# Patient Record
Sex: FEMALE | Race: WHITE | Employment: OTHER | URBAN - METROPOLITAN AREA
[De-identification: names, ages, dates, MRNs, and addresses within clinical notes are randomized per-mention and may not be internally consistent; named-entity substitution may affect disease eponyms.]

---

## 2017-01-01 ENCOUNTER — HOSPITAL ENCOUNTER (EMERGENCY)
Age: 82
Discharge: HOME | End: 2017-09-10
Attending: EMERGENCY MEDICINE
Payer: MEDICARE

## 2017-01-01 ENCOUNTER — HOSPITAL ENCOUNTER (OUTPATIENT)
Dept: CARDIAC CATH/INVASIVE PROCEDURES | Age: 82
Discharge: HOME | End: 2017-10-18
Attending: INTERNAL MEDICINE | Admitting: INTERNAL MEDICINE
Payer: MEDICARE

## 2017-01-01 ENCOUNTER — HOSPITAL ENCOUNTER (OUTPATIENT)
Dept: CARDIOLOGY | Age: 82
Discharge: HOME | End: 2017-10-05
Payer: MEDICARE

## 2017-01-01 ENCOUNTER — TELEPHONE (OUTPATIENT)
Dept: SURGERY | Age: 82
End: 2017-01-01

## 2017-01-01 ENCOUNTER — HOSPITAL ENCOUNTER (OUTPATIENT)
Dept: CARDIOLOGY | Age: 82
Discharge: HOME | End: 2017-08-02
Payer: MEDICARE

## 2017-01-01 ENCOUNTER — HOSPITAL ENCOUNTER (EMERGENCY)
Age: 82
Discharge: HOME | End: 2017-12-20
Attending: EMERGENCY MEDICINE
Payer: MEDICARE

## 2017-01-01 ENCOUNTER — HOSPITAL ENCOUNTER (OUTPATIENT)
Dept: CARDIOLOGY | Age: 82
Discharge: HOME | End: 2017-09-05
Payer: MEDICARE

## 2017-01-01 ENCOUNTER — APPOINTMENT (OUTPATIENT)
Dept: CT IMAGING | Age: 82
End: 2017-01-01
Payer: MEDICARE

## 2017-01-01 ENCOUNTER — TELEPHONE (OUTPATIENT)
Dept: INTERNAL MEDICINE | Age: 82
End: 2017-01-01

## 2017-01-01 ENCOUNTER — HOSPITAL ENCOUNTER (OUTPATIENT)
Dept: INFUSION THERAPY | Age: 82
Setting detail: INFUSION SERIES
Discharge: HOME | End: 2017-12-22
Payer: MEDICARE

## 2017-01-01 ENCOUNTER — HOSPITAL ENCOUNTER (OUTPATIENT)
Age: 82
Setting detail: OBSERVATION
Discharge: HOME | End: 2017-12-28
Attending: INTERNAL MEDICINE | Admitting: INTERNAL MEDICINE
Payer: MEDICARE

## 2017-01-01 ENCOUNTER — NURSE ONLY (OUTPATIENT)
Dept: INTERNAL MEDICINE | Age: 82
End: 2017-01-01

## 2017-01-01 ENCOUNTER — OFFICE VISIT (OUTPATIENT)
Dept: INTERNAL MEDICINE | Age: 82
End: 2017-01-01

## 2017-01-01 ENCOUNTER — OFFICE VISIT (OUTPATIENT)
Dept: SURGERY | Age: 82
End: 2017-01-01

## 2017-01-01 VITALS
HEIGHT: 65 IN | OXYGEN SATURATION: 97 % | WEIGHT: 150 LBS | DIASTOLIC BLOOD PRESSURE: 53 MMHG | RESPIRATION RATE: 18 BRPM | HEART RATE: 64 BPM | BODY MASS INDEX: 24.99 KG/M2 | SYSTOLIC BLOOD PRESSURE: 161 MMHG | TEMPERATURE: 96.1 F

## 2017-01-01 VITALS
RESPIRATION RATE: 14 BRPM | WEIGHT: 155.4 LBS | BODY MASS INDEX: 25.89 KG/M2 | TEMPERATURE: 98.3 F | HEART RATE: 63 BPM | DIASTOLIC BLOOD PRESSURE: 60 MMHG | SYSTOLIC BLOOD PRESSURE: 122 MMHG | OXYGEN SATURATION: 97 % | HEIGHT: 65 IN

## 2017-01-01 VITALS
BODY MASS INDEX: 27.46 KG/M2 | HEIGHT: 63 IN | OXYGEN SATURATION: 96 % | DIASTOLIC BLOOD PRESSURE: 68 MMHG | WEIGHT: 155 LBS | HEART RATE: 64 BPM | SYSTOLIC BLOOD PRESSURE: 142 MMHG | RESPIRATION RATE: 16 BRPM | TEMPERATURE: 97.9 F

## 2017-01-01 VITALS
OXYGEN SATURATION: 98 % | HEART RATE: 59 BPM | WEIGHT: 159 LBS | SYSTOLIC BLOOD PRESSURE: 109 MMHG | HEIGHT: 63 IN | TEMPERATURE: 98.1 F | BODY MASS INDEX: 28.17 KG/M2 | RESPIRATION RATE: 18 BRPM | DIASTOLIC BLOOD PRESSURE: 41 MMHG

## 2017-01-01 VITALS
WEIGHT: 159 LBS | TEMPERATURE: 97.5 F | DIASTOLIC BLOOD PRESSURE: 62 MMHG | HEIGHT: 62 IN | SYSTOLIC BLOOD PRESSURE: 130 MMHG | BODY MASS INDEX: 29.26 KG/M2

## 2017-01-01 VITALS — WEIGHT: 145 LBS | HEIGHT: 65 IN | BODY MASS INDEX: 24.16 KG/M2

## 2017-01-01 DIAGNOSIS — N18.4 TYPE 2 DIABETES MELLITUS WITH STAGE 4 CHRONIC KIDNEY DISEASE, WITH LONG-TERM CURRENT USE OF INSULIN (HCC): ICD-10-CM

## 2017-01-01 DIAGNOSIS — D64.9 ANEMIA, UNSPECIFIED TYPE: ICD-10-CM

## 2017-01-01 DIAGNOSIS — Z79.4 TYPE 2 DIABETES MELLITUS WITH STAGE 4 CHRONIC KIDNEY DISEASE, WITH LONG-TERM CURRENT USE OF INSULIN (HCC): ICD-10-CM

## 2017-01-01 DIAGNOSIS — I10 ESSENTIAL HYPERTENSION: Primary | ICD-10-CM

## 2017-01-01 DIAGNOSIS — L98.491 SKIN ULCER, LIMITED TO BREAKDOWN OF SKIN (HCC): Primary | ICD-10-CM

## 2017-01-01 DIAGNOSIS — E11.22 TYPE 2 DIABETES MELLITUS WITH STAGE 4 CHRONIC KIDNEY DISEASE, WITH LONG-TERM CURRENT USE OF INSULIN (HCC): ICD-10-CM

## 2017-01-01 DIAGNOSIS — S32.120A CLOSED NONDISPLACED ZONE II FRACTURE OF SACRUM, INITIAL ENCOUNTER (HCC): ICD-10-CM

## 2017-01-01 DIAGNOSIS — Z23 NEED FOR INFLUENZA VACCINATION: Primary | ICD-10-CM

## 2017-01-01 DIAGNOSIS — K94.00 COLOSTOMY COMPLICATION (HCC): Primary | ICD-10-CM

## 2017-01-01 DIAGNOSIS — Z43.3 COLOSTOMY CARE (HCC): Primary | ICD-10-CM

## 2017-01-01 DIAGNOSIS — L98.491 SKIN ULCER OF ABDOMEN, LIMITED TO BREAKDOWN OF SKIN (HCC): Primary | ICD-10-CM

## 2017-01-01 DIAGNOSIS — E78.2 MIXED HYPERLIPIDEMIA: ICD-10-CM

## 2017-01-01 DIAGNOSIS — Z71.89 ENCOUNTER FOR OSTOMY CARE EDUCATION: Primary | ICD-10-CM

## 2017-01-01 LAB
ALBUMIN SERPL-MCNC: 3.4 G/DL (ref 3.9–4.9)
ALBUMIN SERPL-MCNC: 3.5 G/DL (ref 3.9–4.9)
ALP BLD-CCNC: 166 U/L (ref 40–130)
ALP BLD-CCNC: 199 U/L (ref 40–130)
ALT SERPL-CCNC: 15 U/L (ref 0–33)
ALT SERPL-CCNC: 18 U/L (ref 0–33)
ANAEROBIC CULTURE: ABNORMAL
ANION GAP SERPL CALCULATED.3IONS-SCNC: 11 MEQ/L (ref 7–13)
ANION GAP SERPL CALCULATED.3IONS-SCNC: 13 MEQ/L (ref 7–13)
ANION GAP SERPL CALCULATED.3IONS-SCNC: 14 MEQ/L (ref 7–13)
ANION GAP SERPL CALCULATED.3IONS-SCNC: 8 MEQ/L (ref 7–13)
AST SERPL-CCNC: 32 U/L (ref 0–35)
AST SERPL-CCNC: 36 U/L (ref 0–35)
BASOPHILS ABSOLUTE: 0 K/UL (ref 0–0.2)
BASOPHILS ABSOLUTE: 0 K/UL (ref 0–0.2)
BASOPHILS RELATIVE PERCENT: 0.4 %
BASOPHILS RELATIVE PERCENT: 0.7 %
BILIRUB SERPL-MCNC: 0.6 MG/DL (ref 0–1.2)
BILIRUB SERPL-MCNC: 0.7 MG/DL (ref 0–1.2)
BUN BLDV-MCNC: 33 MG/DL (ref 8–23)
BUN BLDV-MCNC: 41 MG/DL (ref 8–23)
BUN BLDV-MCNC: 42 MG/DL (ref 8–23)
BUN BLDV-MCNC: 43 MG/DL (ref 8–23)
CALCIUM SERPL-MCNC: 8.7 MG/DL (ref 8.6–10.2)
CALCIUM SERPL-MCNC: 8.9 MG/DL (ref 8.6–10.2)
CALCIUM SERPL-MCNC: 9.3 MG/DL (ref 8.6–10.2)
CALCIUM SERPL-MCNC: 9.5 MG/DL (ref 8.6–10.2)
CHLORIDE BLD-SCNC: 92 MEQ/L (ref 98–107)
CHLORIDE BLD-SCNC: 93 MEQ/L (ref 98–107)
CHLORIDE BLD-SCNC: 94 MEQ/L (ref 98–107)
CHLORIDE BLD-SCNC: 94 MEQ/L (ref 98–107)
CO2: 27 MEQ/L (ref 22–29)
CO2: 28 MEQ/L (ref 22–29)
CO2: 31 MEQ/L (ref 22–29)
CO2: 32 MEQ/L (ref 22–29)
CREAT SERPL-MCNC: 1.4 MG/DL (ref 0.5–0.9)
CREAT SERPL-MCNC: 1.51 MG/DL (ref 0.5–0.9)
CREAT SERPL-MCNC: 1.54 MG/DL (ref 0.5–0.9)
CREAT SERPL-MCNC: 1.59 MG/DL (ref 0.5–0.9)
EOSINOPHILS ABSOLUTE: 0.1 K/UL (ref 0–0.7)
EOSINOPHILS ABSOLUTE: 0.1 K/UL (ref 0–0.7)
EOSINOPHILS RELATIVE PERCENT: 1.7 %
EOSINOPHILS RELATIVE PERCENT: 2.2 %
GFR AFRICAN AMERICAN: 32
GFR AFRICAN AMERICAN: 37
GFR AFRICAN AMERICAN: 38.4
GFR AFRICAN AMERICAN: 39.3
GFR AFRICAN AMERICAN: 42.9
GFR NON-AFRICAN AMERICAN: 27
GFR NON-AFRICAN AMERICAN: 30.6
GFR NON-AFRICAN AMERICAN: 31.8
GFR NON-AFRICAN AMERICAN: 32.5
GFR NON-AFRICAN AMERICAN: 35.4
GLOBULIN: 3.8 G/DL (ref 2.3–3.5)
GLOBULIN: 4.1 G/DL (ref 2.3–3.5)
GLUCOSE BLD-MCNC: 136 MG/DL (ref 74–109)
GLUCOSE BLD-MCNC: 146 MG/DL (ref 74–109)
GLUCOSE BLD-MCNC: 197 MG/DL (ref 60–115)
GLUCOSE BLD-MCNC: 250 MG/DL (ref 74–109)
GLUCOSE BLD-MCNC: 251 MG/DL (ref 74–109)
GLUCOSE BLD-MCNC: 286 MG/DL (ref 60–115)
GRAM STAIN RESULT: ABNORMAL
HCT VFR BLD CALC: 33.5 % (ref 37–47)
HCT VFR BLD CALC: 34.6 % (ref 37–47)
HCT VFR BLD CALC: 36.7 % (ref 37–47)
HEMOCCULT STL QL: NORMAL
HEMOGLOBIN: 10.9 G/DL (ref 12–16)
HEMOGLOBIN: 11.5 G/DL (ref 12–16)
HEMOGLOBIN: 12 G/DL (ref 12–16)
LACTIC ACID: 1.9 MMOL/L (ref 0.5–2.2)
LYMPHOCYTES ABSOLUTE: 0.9 K/UL (ref 1–4.8)
LYMPHOCYTES ABSOLUTE: 1.3 K/UL (ref 1–4.8)
LYMPHOCYTES RELATIVE PERCENT: 14.5 %
LYMPHOCYTES RELATIVE PERCENT: 21.8 %
MAGNESIUM: 2.3 MG/DL (ref 1.7–2.3)
MCH RBC QN AUTO: 29.8 PG (ref 27–31.3)
MCH RBC QN AUTO: 30.8 PG (ref 27–31.3)
MCH RBC QN AUTO: 31.1 PG (ref 27–31.3)
MCHC RBC AUTO-ENTMCNC: 32.4 % (ref 33–37)
MCHC RBC AUTO-ENTMCNC: 32.6 % (ref 33–37)
MCHC RBC AUTO-ENTMCNC: 33.3 % (ref 33–37)
MCV RBC AUTO: 91.8 FL (ref 82–100)
MCV RBC AUTO: 93.5 FL (ref 82–100)
MCV RBC AUTO: 94.4 FL (ref 82–100)
MONOCYTES ABSOLUTE: 0.7 K/UL (ref 0.2–0.8)
MONOCYTES ABSOLUTE: 0.9 K/UL (ref 0.2–0.8)
MONOCYTES RELATIVE PERCENT: 11 %
MONOCYTES RELATIVE PERCENT: 14.1 %
NEUTROPHILS ABSOLUTE: 3.8 K/UL (ref 1.4–6.5)
NEUTROPHILS ABSOLUTE: 4.6 K/UL (ref 1.4–6.5)
NEUTROPHILS RELATIVE PERCENT: 61.2 %
NEUTROPHILS RELATIVE PERCENT: 72.4 %
ORGANISM: ABNORMAL
PDW BLD-RTO: 14.9 % (ref 11.5–14.5)
PDW BLD-RTO: 15 % (ref 11.5–14.5)
PDW BLD-RTO: 15.1 % (ref 11.5–14.5)
PERFORMED ON: ABNORMAL
PLATELET # BLD: 104 K/UL (ref 130–400)
PLATELET # BLD: 105 K/UL (ref 130–400)
PLATELET # BLD: 122 K/UL (ref 130–400)
POC CREATININE: 1.8 MG/DL (ref 0.6–1.2)
POC SAMPLE TYPE: ABNORMAL
POTASSIUM SERPL-SCNC: 4.2 MEQ/L (ref 3.5–5.1)
POTASSIUM SERPL-SCNC: 4.4 MEQ/L (ref 3.5–5.1)
POTASSIUM SERPL-SCNC: 4.4 MEQ/L (ref 3.5–5.1)
POTASSIUM SERPL-SCNC: 4.5 MEQ/L (ref 3.5–5.1)
RBC # BLD: 3.65 M/UL (ref 4.2–5.4)
RBC # BLD: 3.7 M/UL (ref 4.2–5.4)
RBC # BLD: 3.89 M/UL (ref 4.2–5.4)
SODIUM BLD-SCNC: 133 MEQ/L (ref 132–144)
SODIUM BLD-SCNC: 134 MEQ/L (ref 132–144)
SODIUM BLD-SCNC: 135 MEQ/L (ref 132–144)
SODIUM BLD-SCNC: 135 MEQ/L (ref 132–144)
TOTAL PROTEIN: 7.2 G/DL (ref 6.4–8.1)
TOTAL PROTEIN: 7.6 G/DL (ref 6.4–8.1)
WBC # BLD: 5.8 K/UL (ref 4.8–10.8)
WBC # BLD: 6.2 K/UL (ref 4.8–10.8)
WBC # BLD: 6.4 K/UL (ref 4.8–10.8)
WOUND/ABSCESS: ABNORMAL

## 2017-01-01 PROCEDURE — 90662 IIV NO PRSV INCREASED AG IM: CPT | Performed by: FAMILY MEDICINE

## 2017-01-01 PROCEDURE — 99282 EMERGENCY DEPT VISIT SF MDM: CPT

## 2017-01-01 PROCEDURE — 1036F TOBACCO NON-USER: CPT | Performed by: FAMILY MEDICINE

## 2017-01-01 PROCEDURE — 93296 REM INTERROG EVL PM/IDS: CPT

## 2017-01-01 PROCEDURE — 85025 COMPLETE CBC W/AUTO DIFF WBC: CPT

## 2017-01-01 PROCEDURE — 99215 OFFICE O/P EST HI 40 MIN: CPT | Performed by: SURGERY

## 2017-01-01 PROCEDURE — 1036F TOBACCO NON-USER: CPT | Performed by: SURGERY

## 2017-01-01 PROCEDURE — 99285 EMERGENCY DEPT VISIT HI MDM: CPT

## 2017-01-01 PROCEDURE — C1786 PMKR, SINGLE, RATE-RESP: HCPCS | Performed by: NURSE PRACTITIONER

## 2017-01-01 PROCEDURE — 6360000002 HC RX W HCPCS

## 2017-01-01 PROCEDURE — 6360000002 HC RX W HCPCS: Performed by: INTERNAL MEDICINE

## 2017-01-01 PROCEDURE — 87185 SC STD ENZYME DETCJ PER NZM: CPT

## 2017-01-01 PROCEDURE — 80048 BASIC METABOLIC PNL TOTAL CA: CPT

## 2017-01-01 PROCEDURE — 83605 ASSAY OF LACTIC ACID: CPT

## 2017-01-01 PROCEDURE — 99283 EMERGENCY DEPT VISIT LOW MDM: CPT

## 2017-01-01 PROCEDURE — 36415 COLL VENOUS BLD VENIPUNCTURE: CPT

## 2017-01-01 PROCEDURE — 1123F ACP DISCUSS/DSCN MKR DOCD: CPT | Performed by: FAMILY MEDICINE

## 2017-01-01 PROCEDURE — 99215 OFFICE O/P EST HI 40 MIN: CPT

## 2017-01-01 PROCEDURE — 6370000000 HC RX 637 (ALT 250 FOR IP)

## 2017-01-01 PROCEDURE — 74176 CT ABD & PELVIS W/O CONTRAST: CPT

## 2017-01-01 PROCEDURE — 1123F ACP DISCUSS/DSCN MKR DOCD: CPT | Performed by: SURGERY

## 2017-01-01 PROCEDURE — 99213 OFFICE O/P EST LOW 20 MIN: CPT | Performed by: SURGERY

## 2017-01-01 PROCEDURE — G8598 ASA/ANTIPLAT THER USED: HCPCS | Performed by: FAMILY MEDICINE

## 2017-01-01 PROCEDURE — 96372 THER/PROPH/DIAG INJ SC/IM: CPT

## 2017-01-01 PROCEDURE — 4040F PNEUMOC VAC/ADMIN/RCVD: CPT | Performed by: FAMILY MEDICINE

## 2017-01-01 PROCEDURE — G0378 HOSPITAL OBSERVATION PER HR: HCPCS

## 2017-01-01 PROCEDURE — 80053 COMPREHEN METABOLIC PANEL: CPT

## 2017-01-01 PROCEDURE — 87075 CULTR BACTERIA EXCEPT BLOOD: CPT

## 2017-01-01 PROCEDURE — 87070 CULTURE OTHR SPECIMN AEROBIC: CPT

## 2017-01-01 PROCEDURE — 6370000000 HC RX 637 (ALT 250 FOR IP): Performed by: INTERNAL MEDICINE

## 2017-01-01 PROCEDURE — 4040F PNEUMOC VAC/ADMIN/RCVD: CPT | Performed by: SURGERY

## 2017-01-01 PROCEDURE — 87205 SMEAR GRAM STAIN: CPT

## 2017-01-01 PROCEDURE — 99213 OFFICE O/P EST LOW 20 MIN: CPT | Performed by: FAMILY MEDICINE

## 2017-01-01 PROCEDURE — G8419 CALC BMI OUT NRM PARAM NOF/U: HCPCS | Performed by: FAMILY MEDICINE

## 2017-01-01 PROCEDURE — 1090F PRES/ABSN URINE INCON ASSESS: CPT | Performed by: FAMILY MEDICINE

## 2017-01-01 PROCEDURE — G0008 ADMIN INFLUENZA VIRUS VAC: HCPCS | Performed by: FAMILY MEDICINE

## 2017-01-01 PROCEDURE — 33227 REMOVE&REPLACE PM GEN SINGL: CPT | Performed by: INTERNAL MEDICINE

## 2017-01-01 PROCEDURE — 2580000003 HC RX 258: Performed by: INTERNAL MEDICINE

## 2017-01-01 PROCEDURE — 2580000003 HC RX 258: Performed by: EMERGENCY MEDICINE

## 2017-01-01 PROCEDURE — 87040 BLOOD CULTURE FOR BACTERIA: CPT

## 2017-01-01 PROCEDURE — 83735 ASSAY OF MAGNESIUM: CPT

## 2017-01-01 PROCEDURE — G8598 ASA/ANTIPLAT THER USED: HCPCS | Performed by: SURGERY

## 2017-01-01 PROCEDURE — G8427 DOCREV CUR MEDS BY ELIG CLIN: HCPCS | Performed by: SURGERY

## 2017-01-01 PROCEDURE — 2580000003 HC RX 258

## 2017-01-01 PROCEDURE — 1090F PRES/ABSN URINE INCON ASSESS: CPT | Performed by: SURGERY

## 2017-01-01 PROCEDURE — G8419 CALC BMI OUT NRM PARAM NOF/U: HCPCS | Performed by: SURGERY

## 2017-01-01 PROCEDURE — G8427 DOCREV CUR MEDS BY ELIG CLIN: HCPCS | Performed by: FAMILY MEDICINE

## 2017-01-01 PROCEDURE — 2500000003 HC RX 250 WO HCPCS

## 2017-01-01 PROCEDURE — G8484 FLU IMMUNIZE NO ADMIN: HCPCS | Performed by: FAMILY MEDICINE

## 2017-01-01 RX ORDER — ACETAMINOPHEN 325 MG/1
650 TABLET ORAL EVERY 4 HOURS PRN
Status: DISCONTINUED | OUTPATIENT
Start: 2017-01-01 | End: 2017-01-01 | Stop reason: HOSPADM

## 2017-01-01 RX ORDER — ONDANSETRON 2 MG/ML
4 INJECTION INTRAMUSCULAR; INTRAVENOUS EVERY 6 HOURS PRN
Status: DISCONTINUED | OUTPATIENT
Start: 2017-01-01 | End: 2017-01-01 | Stop reason: HOSPADM

## 2017-01-01 RX ORDER — DEXTROSE MONOHYDRATE 25 G/50ML
12.5 INJECTION, SOLUTION INTRAVENOUS PRN
Status: DISCONTINUED | OUTPATIENT
Start: 2017-01-01 | End: 2017-01-01 | Stop reason: HOSPADM

## 2017-01-01 RX ORDER — ASPIRIN 81 MG/1
81 TABLET, CHEWABLE ORAL 2 TIMES DAILY
Status: DISCONTINUED | OUTPATIENT
Start: 2017-01-01 | End: 2017-01-01 | Stop reason: HOSPADM

## 2017-01-01 RX ORDER — ISOSORBIDE MONONITRATE 60 MG/1
60 TABLET, EXTENDED RELEASE ORAL DAILY
Status: DISCONTINUED | OUTPATIENT
Start: 2017-01-01 | End: 2017-01-01 | Stop reason: HOSPADM

## 2017-01-01 RX ORDER — DEXTROSE MONOHYDRATE 50 MG/ML
100 INJECTION, SOLUTION INTRAVENOUS PRN
Status: DISCONTINUED | OUTPATIENT
Start: 2017-01-01 | End: 2017-01-01 | Stop reason: HOSPADM

## 2017-01-01 RX ORDER — SPIRONOLACTONE 25 MG/1
25 TABLET ORAL DAILY
Status: DISCONTINUED | OUTPATIENT
Start: 2017-01-01 | End: 2017-01-01 | Stop reason: HOSPADM

## 2017-01-01 RX ORDER — NICOTINE POLACRILEX 4 MG
15 LOZENGE BUCCAL PRN
Status: DISCONTINUED | OUTPATIENT
Start: 2017-01-01 | End: 2017-01-01 | Stop reason: HOSPADM

## 2017-01-01 RX ORDER — CARVEDILOL 6.25 MG/1
6.25 TABLET ORAL 2 TIMES DAILY
Status: DISCONTINUED | OUTPATIENT
Start: 2017-01-01 | End: 2017-01-01 | Stop reason: HOSPADM

## 2017-01-01 RX ORDER — 0.9 % SODIUM CHLORIDE 0.9 %
1000 INTRAVENOUS SOLUTION INTRAVENOUS ONCE
Status: COMPLETED | OUTPATIENT
Start: 2017-01-01 | End: 2017-01-01

## 2017-01-01 RX ORDER — MORPHINE SULFATE 4 MG/ML
4 INJECTION, SOLUTION INTRAMUSCULAR; INTRAVENOUS
Status: DISCONTINUED | OUTPATIENT
Start: 2017-01-01 | End: 2017-01-01 | Stop reason: HOSPADM

## 2017-01-01 RX ORDER — SODIUM CHLORIDE 450 MG/100ML
INJECTION, SOLUTION INTRAVENOUS CONTINUOUS
Status: DISCONTINUED | OUTPATIENT
Start: 2017-01-01 | End: 2017-01-01 | Stop reason: HOSPADM

## 2017-01-01 RX ORDER — HYDROCODONE BITARTRATE AND ACETAMINOPHEN 5; 325 MG/1; MG/1
1 TABLET ORAL EVERY 6 HOURS PRN
Status: DISCONTINUED | OUTPATIENT
Start: 2017-01-01 | End: 2017-01-01 | Stop reason: HOSPADM

## 2017-01-01 RX ORDER — FERROUS SULFATE 325(65) MG
325 TABLET ORAL
Status: DISCONTINUED | OUTPATIENT
Start: 2017-01-01 | End: 2017-01-01 | Stop reason: HOSPADM

## 2017-01-01 RX ORDER — PEN NEEDLE, DIABETIC 31 G X1/4"
NEEDLE, DISPOSABLE MISCELLANEOUS
Qty: 300 EACH | Refills: 1 | Status: ON HOLD | OUTPATIENT
Start: 2017-01-01 | End: 2017-01-01

## 2017-01-01 RX ORDER — MORPHINE SULFATE 2 MG/ML
2 INJECTION, SOLUTION INTRAMUSCULAR; INTRAVENOUS
Status: DISCONTINUED | OUTPATIENT
Start: 2017-01-01 | End: 2017-01-01 | Stop reason: HOSPADM

## 2017-01-01 RX ADMIN — ENOXAPARIN SODIUM 40 MG: 40 INJECTION, SOLUTION INTRAVENOUS; SUBCUTANEOUS at 09:35

## 2017-01-01 RX ADMIN — INSULIN LISPRO 12 UNITS: 100 INJECTION, SUSPENSION SUBCUTANEOUS at 12:43

## 2017-01-01 RX ADMIN — SODIUM CHLORIDE: 4.5 INJECTION, SOLUTION INTRAVENOUS at 07:40

## 2017-01-01 RX ADMIN — FERROUS SULFATE TAB 325 MG (65 MG ELEMENTAL FE) 325 MG: 325 (65 FE) TAB at 09:36

## 2017-01-01 RX ADMIN — ISOSORBIDE MONONITRATE 60 MG: 60 TABLET, EXTENDED RELEASE ORAL at 09:36

## 2017-01-01 RX ADMIN — Medication 400 MG: at 09:36

## 2017-01-01 RX ADMIN — ASPIRIN 81 MG 81 MG: 81 TABLET ORAL at 09:36

## 2017-01-01 RX ADMIN — SPIRONOLACTONE 25 MG: 25 TABLET ORAL at 09:36

## 2017-01-01 RX ADMIN — CARVEDILOL 6.25 MG: 6.25 TABLET, FILM COATED ORAL at 09:36

## 2017-01-01 RX ADMIN — SODIUM CHLORIDE 1000 ML: 9 INJECTION, SOLUTION INTRAVENOUS at 21:22

## 2017-01-01 ASSESSMENT — ENCOUNTER SYMPTOMS
SHORTNESS OF BREATH: 0
ABDOMINAL DISTENTION: 1
SHORTNESS OF BREATH: 0
CHEST TIGHTNESS: 0
EYES NEGATIVE: 1
CONSTIPATION: 0
SHORTNESS OF BREATH: 0
SORE THROAT: 0
SHORTNESS OF BREATH: 0
PHOTOPHOBIA: 0
RHINORRHEA: 0
RESPIRATORY NEGATIVE: 1
DIARRHEA: 0
COUGH: 0
COUGH: 0
SORE THROAT: 0
ABDOMINAL PAIN: 0
GASTROINTESTINAL NEGATIVE: 1
COLOR CHANGE: 0
EYE DISCHARGE: 0
NAUSEA: 0
ABDOMINAL PAIN: 0
BACK PAIN: 1
TROUBLE SWALLOWING: 0
STRIDOR: 0
COUGH: 0
ABDOMINAL DISTENTION: 0
CHEST TIGHTNESS: 0
DIARRHEA: 0
ALLERGIC/IMMUNOLOGIC NEGATIVE: 1
ABDOMINAL PAIN: 0
VOMITING: 0
BACK PAIN: 0
ABDOMINAL PAIN: 1
BACK PAIN: 0
NAUSEA: 0
ABDOMINAL DISTENTION: 0
WHEEZING: 0
VOMITING: 0
NAUSEA: 0
RECTAL PAIN: 0
VOMITING: 0
WHEEZING: 0
COLOR CHANGE: 0
BLOOD IN STOOL: 0

## 2017-01-01 ASSESSMENT — PATIENT HEALTH QUESTIONNAIRE - PHQ9
2. FEELING DOWN, DEPRESSED OR HOPELESS: 0
1. LITTLE INTEREST OR PLEASURE IN DOING THINGS: 0
SUM OF ALL RESPONSES TO PHQ QUESTIONS 1-9: 0
SUM OF ALL RESPONSES TO PHQ9 QUESTIONS 1 & 2: 0

## 2017-01-01 ASSESSMENT — PAIN DESCRIPTION - PAIN TYPE
TYPE: ACUTE PAIN
TYPE: ACUTE PAIN

## 2017-01-01 ASSESSMENT — PAIN SCALES - GENERAL
PAINLEVEL_OUTOF10: 2
PAINLEVEL_OUTOF10: 6

## 2017-01-01 ASSESSMENT — PAIN DESCRIPTION - FREQUENCY: FREQUENCY: CONTINUOUS

## 2017-01-01 ASSESSMENT — PAIN DESCRIPTION - LOCATION
LOCATION: ABDOMEN
LOCATION: OTHER (COMMENT)

## 2017-01-01 ASSESSMENT — PAIN DESCRIPTION - DESCRIPTORS: DESCRIPTORS: DISCOMFORT

## 2017-01-11 ENCOUNTER — APPOINTMENT (OUTPATIENT)
Dept: GENERAL RADIOLOGY | Age: 82
End: 2017-01-11
Payer: MEDICARE

## 2017-01-11 ENCOUNTER — HOSPITAL ENCOUNTER (EMERGENCY)
Age: 82
Discharge: HOME | End: 2017-01-11
Payer: MEDICARE

## 2017-01-11 VITALS
BODY MASS INDEX: 28.52 KG/M2 | WEIGHT: 155 LBS | TEMPERATURE: 98.2 F | RESPIRATION RATE: 18 BRPM | HEIGHT: 62 IN | SYSTOLIC BLOOD PRESSURE: 123 MMHG | DIASTOLIC BLOOD PRESSURE: 63 MMHG | HEART RATE: 63 BPM | OXYGEN SATURATION: 98 %

## 2017-01-11 DIAGNOSIS — M10.00 ACUTE IDIOPATHIC GOUT, UNSPECIFIED SITE: Primary | ICD-10-CM

## 2017-01-11 DIAGNOSIS — M25.531 RIGHT WRIST PAIN: ICD-10-CM

## 2017-01-11 LAB — URIC ACID, SERUM: 11 MG/DL (ref 2.4–5.7)

## 2017-01-11 PROCEDURE — 73110 X-RAY EXAM OF WRIST: CPT

## 2017-01-11 PROCEDURE — 36415 COLL VENOUS BLD VENIPUNCTURE: CPT

## 2017-01-11 PROCEDURE — 84550 ASSAY OF BLOOD/URIC ACID: CPT

## 2017-01-11 PROCEDURE — 29125 APPL SHORT ARM SPLINT STATIC: CPT

## 2017-01-11 PROCEDURE — 99283 EMERGENCY DEPT VISIT LOW MDM: CPT

## 2017-01-11 RX ORDER — ACETAMINOPHEN 500 MG
500 TABLET ORAL EVERY 6 HOURS PRN
Qty: 20 TABLET | Refills: 0 | Status: SHIPPED | OUTPATIENT
Start: 2017-01-11

## 2017-01-11 RX ORDER — COLCHICINE 0.6 MG/1
TABLET ORAL
Qty: 3 TABLET | Refills: 0 | Status: SHIPPED | OUTPATIENT
Start: 2017-01-11 | End: 2017-05-03

## 2017-01-11 ASSESSMENT — ENCOUNTER SYMPTOMS
RESPIRATORY NEGATIVE: 1
EYES NEGATIVE: 1
GASTROINTESTINAL NEGATIVE: 1

## 2017-01-11 ASSESSMENT — PAIN DESCRIPTION - PAIN TYPE: TYPE: ACUTE PAIN

## 2017-01-11 ASSESSMENT — PAIN DESCRIPTION - ORIENTATION: ORIENTATION: RIGHT

## 2017-01-11 ASSESSMENT — PAIN SCALES - GENERAL: PAINLEVEL_OUTOF10: 10

## 2017-01-11 ASSESSMENT — PAIN DESCRIPTION - LOCATION: LOCATION: WRIST

## 2017-01-17 ENCOUNTER — TELEPHONE (OUTPATIENT)
Dept: INTERNAL MEDICINE | Age: 82
End: 2017-01-17

## 2017-01-17 ENCOUNTER — OFFICE VISIT (OUTPATIENT)
Dept: INTERNAL MEDICINE | Age: 82
End: 2017-01-17

## 2017-01-17 VITALS
SYSTOLIC BLOOD PRESSURE: 122 MMHG | RESPIRATION RATE: 12 BRPM | BODY MASS INDEX: 27.86 KG/M2 | OXYGEN SATURATION: 98 % | TEMPERATURE: 98.2 F | HEART RATE: 70 BPM | DIASTOLIC BLOOD PRESSURE: 60 MMHG | HEIGHT: 62 IN | WEIGHT: 151.4 LBS

## 2017-01-17 DIAGNOSIS — L98.421 SACRAL ULCER, LIMITED TO BREAKDOWN OF SKIN (HCC): ICD-10-CM

## 2017-01-17 DIAGNOSIS — N18.4 TYPE 2 DIABETES MELLITUS WITH STAGE 4 CHRONIC KIDNEY DISEASE, WITH LONG-TERM CURRENT USE OF INSULIN (HCC): ICD-10-CM

## 2017-01-17 DIAGNOSIS — E11.22 TYPE 2 DIABETES MELLITUS WITH STAGE 4 CHRONIC KIDNEY DISEASE, WITH LONG-TERM CURRENT USE OF INSULIN (HCC): ICD-10-CM

## 2017-01-17 DIAGNOSIS — K94.01 BLEEDING FROM COLOSTOMY STOMA (HCC): ICD-10-CM

## 2017-01-17 DIAGNOSIS — I48.20 CHRONIC ATRIAL FIBRILLATION (HCC): ICD-10-CM

## 2017-01-17 DIAGNOSIS — K94.13 COLOSTOMY AND ENTEROSTOMY MALFUNCTION (HCC): ICD-10-CM

## 2017-01-17 DIAGNOSIS — M10.9 ACUTE GOUT OF RIGHT WRIST, UNSPECIFIED CAUSE: Primary | ICD-10-CM

## 2017-01-17 DIAGNOSIS — K94.03 COLOSTOMY AND ENTEROSTOMY MALFUNCTION (HCC): ICD-10-CM

## 2017-01-17 DIAGNOSIS — Z79.4 TYPE 2 DIABETES MELLITUS WITH STAGE 4 CHRONIC KIDNEY DISEASE, WITH LONG-TERM CURRENT USE OF INSULIN (HCC): ICD-10-CM

## 2017-01-17 PROCEDURE — G8598 ASA/ANTIPLAT THER USED: HCPCS | Performed by: FAMILY MEDICINE

## 2017-01-17 PROCEDURE — 1036F TOBACCO NON-USER: CPT | Performed by: FAMILY MEDICINE

## 2017-01-17 PROCEDURE — 4040F PNEUMOC VAC/ADMIN/RCVD: CPT | Performed by: FAMILY MEDICINE

## 2017-01-17 PROCEDURE — 99213 OFFICE O/P EST LOW 20 MIN: CPT | Performed by: FAMILY MEDICINE

## 2017-01-17 PROCEDURE — G8420 CALC BMI NORM PARAMETERS: HCPCS | Performed by: FAMILY MEDICINE

## 2017-01-17 PROCEDURE — 1123F ACP DISCUSS/DSCN MKR DOCD: CPT | Performed by: FAMILY MEDICINE

## 2017-01-17 PROCEDURE — 1090F PRES/ABSN URINE INCON ASSESS: CPT | Performed by: FAMILY MEDICINE

## 2017-01-17 PROCEDURE — G8484 FLU IMMUNIZE NO ADMIN: HCPCS | Performed by: FAMILY MEDICINE

## 2017-01-17 PROCEDURE — G8427 DOCREV CUR MEDS BY ELIG CLIN: HCPCS | Performed by: FAMILY MEDICINE

## 2017-02-17 ENCOUNTER — HOSPITAL ENCOUNTER (OUTPATIENT)
Dept: CARDIOLOGY | Age: 82
Discharge: HOME | End: 2017-02-17
Payer: MEDICARE

## 2017-02-17 PROCEDURE — 93296 REM INTERROG EVL PM/IDS: CPT

## 2017-03-08 ENCOUNTER — TELEPHONE (OUTPATIENT)
Dept: SURGERY | Age: 82
End: 2017-03-08

## 2017-03-08 DIAGNOSIS — Z43.3 COLOSTOMY CARE (HCC): Primary | ICD-10-CM

## 2017-03-10 ENCOUNTER — HOSPITAL ENCOUNTER (OUTPATIENT)
Dept: INFUSION THERAPY | Age: 82
Setting detail: INFUSION SERIES
Discharge: HOME | End: 2017-03-10
Payer: MEDICARE

## 2017-03-10 PROCEDURE — 99215 OFFICE O/P EST HI 40 MIN: CPT

## 2017-03-10 NOTE — PROGRESS NOTES
Wound Ostomy Continence Nurse  Ostomy Referral Follow-up Progress Note      NAME:  Michael Benitez  MEDICAL RECORD NUMBER:  [id number]  AGE: 80 y.o. GENDER:  female  :  3/9/1929  TODAY'S DATE:  3/10/2017    Subjective:    Michael Benitez is a 80 y.o. female referred by:   [x] Physician  [] Nursing  [] Other:     Colostomy and Established    Objective    There were no vitals taken for this visit. [place] Risk Score  : N/A outpatient / ambulatory    Assessment   Surgeon : Dr. Jorge Luis Garcia    Colostomy      Colostomy LLQ (Active)   Stomal Appliance 1 piece;Clean;Dry; Intact 3/10/2017 10:49 AM   Flange Size (inches) 2.25 Inches 3/10/2017 10:49 AM   Stoma  Assessment Red;Moist;Flush 3/10/2017 10:49 AM   Mucocutaneous Junction Intact 3/10/2017 10:49 AM   Peristomal Assessment Clean; Intact 3/10/2017 10:49 AM   Treatment Bag change;Site care;Stoma paste 3/10/2017 10:49 AM   Stool Appearance Soft 3/10/2017 10:49 AM   Stool Color Brown 3/10/2017 10:49 AM   Stool Amount Small 3/10/2017 10:49 AM   Number of days:4130       Patient seen today with daughter Jackie Arias at bedside. Complains that current ostomy appliance has inconsistent wear time - patient states sometime they will get a 7 day wear time and other times they only get a 2 day wear time. 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 Upon arrival, the first thing i noticed was the dustin stomal hernia now protrudes more than the last visit, now about the size of a coconut directly behind the stoma. Stoma is red moist, and flush with skin. Currently appliance removed at this time, patient states this appliance has been in place for approximately 3 days - evidence of stool undermining the flange at 6 o'clock. Patient states that is the area that always starts to leak. Patients abdomen assessed lying, sitting and standing.  When lying the patient's abdomen is soft, stoma reacts positively to the finger push test - signifying the need for convexity in a skin barrier. Patient abdomen then assessed in a seated position. While seated it appears that the dustin stomal hernia bulges and the abdomen is firm - patient should have a soft convexity to avoid pressure injury. Also notice that there is a dip, and uneven surface at 6 o'clock where the stool has been leaking - need to fill the uneven contour to creat a flat pouching surface. Inconsistent wear time is most likely due to the dustin stomal hernia which has increased in size. patient states they have spoken to Dr. Pretty Jurado about this and patient is high risk for surgical correction so he referred her to the Ostomy Nurse. Patient's dustin stomal skin thoroughly cleansed and dried at this time. Patient Moldable ring cut in half and molded to fit the dip in abdominal contour at 6 o'clock to the dustin stomal skin. Paste then used to seal the uneven surfaces from the moldable ring. Patient prefers skin prep, so skin prep wipe applied at this time. Smaller soft convex ostomy system used at this time - aperture cut at 1-1/8. \" Brava barrier strips used to secure the edges. Patient absolutely refuses to use an ostomy support belt. Patients hand held over the appliance for 10 minutes to help warm and activate the adhesive in the appliance. Appliance details and product numbers list below under plan of care. Patient and patient's daughter educated at this time on : 1) dustin stomal hernia and appliance leakage 2) ostomy supplies 3) application of appliance and accessory products to prevent leakage 4) Nu-Hope Support belts for patient's with peristomal hernias    Patient encouraged to try a Nu-Hope Support belt. Patient does not like anything that goes around her waist. i explained that the support belt could help keep the abdominal muscles tight and also help prevent the peristomal hernia from getting any bigger. The support belt could also improve wear time. Patient states she will think about it. I provided the patient with [employer]'s (patient's medical supply [employer]) catalog with the pages makers for the Nu-Hope support belt and ordering information, if the patient chooses to use the support belt. Patient provided with free samples at this time. All supplies used were free samples. No intake or output data in the 24 hours ending 03/10/17 1051    Plan:   Plan for Ostomy Care: Recommending - Coloplast SenSura [place] Convex Light appliance (84682); Coloplast Brava moldable ring 4.2mm (914307);  Stoma paste; Skin prep wipe; [address] strips      Ostomy Plan of Care  [x] Supplies/Instructions left in room  [] Patient using home supplies  [x] Brand/supplies at bedside- Coloplast products    Patient/Caregiver Teaching:  Written Instructions given to patient/family: Patient and patient's daughter Jocelyn Hardy provided:  [x] Reviewed GI and A&P        [x] Supplies  [] Pouch emptying      [] Manipulate closure  [x] Routine Care         [] Comment  [x] Pouch maintenance           Level of patient/caregiver understanding able to:  [x] Indicates understanding       [] Needs reinforcement  [] Unsuccessful      [] Verbal Understanding  [x] Demonstrated understanding       [] No evidence of learning  [] Refused teaching         [] N/A    Electronically signed by Rosaura FLORENCE, RN, [id number] 179[facility] Nurse on 3/10/2017 at 10:51 AM

## 2017-04-17 ENCOUNTER — HOSPITAL ENCOUNTER (OUTPATIENT)
Dept: CARDIOLOGY | Age: 82
Discharge: HOME | End: 2017-04-17
Payer: MEDICARE

## 2017-04-17 PROCEDURE — 93296 REM INTERROG EVL PM/IDS: CPT

## 2017-05-03 ENCOUNTER — OFFICE VISIT (OUTPATIENT)
Dept: INTERNAL MEDICINE | Age: 82
End: 2017-05-03

## 2017-05-03 VITALS
BODY MASS INDEX: 28.56 KG/M2 | RESPIRATION RATE: 16 BRPM | HEART RATE: 74 BPM | DIASTOLIC BLOOD PRESSURE: 60 MMHG | TEMPERATURE: 97.9 F | WEIGHT: 155.2 LBS | HEIGHT: 62 IN | SYSTOLIC BLOOD PRESSURE: 120 MMHG | OXYGEN SATURATION: 97 %

## 2017-05-03 DIAGNOSIS — E11.22 TYPE 2 DIABETES MELLITUS WITH STAGE 4 CHRONIC KIDNEY DISEASE, WITH LONG-TERM CURRENT USE OF INSULIN (HCC): Primary | ICD-10-CM

## 2017-05-03 DIAGNOSIS — Z79.4 TYPE 2 DIABETES MELLITUS WITH STAGE 4 CHRONIC KIDNEY DISEASE, WITH LONG-TERM CURRENT USE OF INSULIN (HCC): Primary | ICD-10-CM

## 2017-05-03 DIAGNOSIS — I10 ESSENTIAL HYPERTENSION: ICD-10-CM

## 2017-05-03 DIAGNOSIS — N18.4 TYPE 2 DIABETES MELLITUS WITH STAGE 4 CHRONIC KIDNEY DISEASE, WITH LONG-TERM CURRENT USE OF INSULIN (HCC): Primary | ICD-10-CM

## 2017-05-03 PROCEDURE — 99213 OFFICE O/P EST LOW 20 MIN: CPT | Performed by: FAMILY MEDICINE

## 2017-05-03 PROCEDURE — G8598 ASA/ANTIPLAT THER USED: HCPCS | Performed by: FAMILY MEDICINE

## 2017-05-03 PROCEDURE — 4040F PNEUMOC VAC/ADMIN/RCVD: CPT | Performed by: FAMILY MEDICINE

## 2017-05-03 PROCEDURE — G8420 CALC BMI NORM PARAMETERS: HCPCS | Performed by: FAMILY MEDICINE

## 2017-05-03 PROCEDURE — 1036F TOBACCO NON-USER: CPT | Performed by: FAMILY MEDICINE

## 2017-05-03 PROCEDURE — 1090F PRES/ABSN URINE INCON ASSESS: CPT | Performed by: FAMILY MEDICINE

## 2017-05-03 PROCEDURE — G8427 DOCREV CUR MEDS BY ELIG CLIN: HCPCS | Performed by: FAMILY MEDICINE

## 2017-05-03 PROCEDURE — 1123F ACP DISCUSS/DSCN MKR DOCD: CPT | Performed by: FAMILY MEDICINE

## 2017-05-23 RX ORDER — LANCETS 33 GAUGE
EACH MISCELLANEOUS
Qty: 100 EACH | Refills: 5 | Status: ON HOLD | OUTPATIENT
Start: 2017-05-23 | End: 2017-01-01

## 2017-05-31 ENCOUNTER — TELEPHONE (OUTPATIENT)
Dept: INTERNAL MEDICINE | Age: 82
End: 2017-05-31

## 2017-06-02 ENCOUNTER — TELEPHONE (OUTPATIENT)
Dept: INTERNAL MEDICINE | Age: 82
End: 2017-06-02

## 2017-06-19 ENCOUNTER — HOSPITAL ENCOUNTER (OUTPATIENT)
Dept: CARDIOLOGY | Age: 82
Discharge: HOME | End: 2017-06-19
Payer: MEDICARE

## 2017-06-19 PROCEDURE — 93296 REM INTERROG EVL PM/IDS: CPT

## 2017-07-18 RX ORDER — TORSEMIDE 20 MG/1
20 TABLET ORAL DAILY
Qty: 30 TABLET | Refills: 0 | Status: SHIPPED | OUTPATIENT
Start: 2017-07-18 | End: 2018-01-01 | Stop reason: DRUGHIGH

## 2017-08-24 PROBLEM — L98.499 SKIN ULCER OF ABDOMEN (HCC): Status: ACTIVE | Noted: 2017-01-01

## 2017-10-18 NOTE — PROGRESS NOTES
Arrived to pre/post from home. Name and date of birth verified along with allergies. Consents for procedure obtained.   Oriented to surroundings and right and responsibility paper given to patient

## 2017-10-19 NOTE — OP NOTE
[address]                     [address] [place], [address] [facility]                               OPERATIVE REPORT    PATIENT NAME: Pema [facility]              :             1929  MED REC NO:   [id number]                           ROOM:  ACCOUNT NO:   [de-identified]                          ADMISSION DATE:  10/18/2017  PROVIDER:     Michael Vieyra MD    DATE OF PROCEDURE:  10/18/2017    BRIEF HISTORY:  This patient is a very pleasant 80year-old patient  who has a history of sick sinus syndrome and had a pacemaker placed  for the sick sinus syndrome in the  remote past.  The patient was  followed on a regular basis by the pacemaker clinic and noted with  generator at end of life. The pacemaker was interrogated right prior  to the procedure and was found to have an underlying rhythm, which was  not found at the pacemaker clinic, so therefore a temporary was not  necessary. On an outpatient basis, the risks of the pacemaker generator change  was explained to the patient and the patient's daughter. The risks  that included infection, bleeding and if new leads are necessary, rare  death, cardiac tamponade, pneumothorax, infection, bleeding, lead  dislodgment, and venous damage. The patient was pretreated with some vancomycin prior to the  procedure. PROCEDURE:  Under fluoroscopic guidance, generator change. OPERATIVE COURSE:  The patient was taken to the cardiac  catheterization lab at [facility]. The patient  prepped and draped in a sterile fashion. The patient was given IV  sedation as well as 1% Lidocaine for local anesthesia. The generator  was identified under fluoroscopic guidance. Sharp and blunt  dissection was made down to the generator can, the can was explanted. The pocket was copiously irrigated out with antibiotic solution. The  lead was found to be adequate.   A new generator was placed and 2-0  absorbable was used in subcutaneous and fascial layers, 3-0 absorbable  suture for the skin. Steri-strips, a sterile dressing and topical  antibiotics were placed. At the time of this dictation, there were no  apparent complications. The patient was discharged back to pre and  postcatheterization area and to look for any untoward hemodynamic  effects. TECHNICAL DATA:  The ventricular lead is 5076-52 lead from [place],  serial #[id number], at 0.4 msec the voltage threshold was  0.9 volts,  current 1.6 milliamps, impedance 595 ohms, R-wave was 6.4 millivolts. The new generator is an Adapta SR from [employer], model [id number],  serial [id number]. It was programmed at lower rate 60, upper rate  110 in VVIR mode. The patient appeared to tolerate the procedure well. The patient will  be observed for any untoward hemodynamic effects. The patient will be discharged home later today for a close followup.         Cristian Roberto MD    D: 10/18/2017 9:38:46       T: 10/19/2017 0:53:40     [id number]  Job#: [id number]     Doc#: [id number]    CC:  Tayler Cronin MD

## 2017-12-18 PROBLEM — D64.9 ANEMIA: Status: ACTIVE | Noted: 2017-01-01

## 2017-12-18 NOTE — PROGRESS NOTES
reports that she quit smoking about 47 years ago. Her smoking use included Cigarettes. She has never used smokeless tobacco.   Daily Aspirin? Yes  Known diabetic complications: none        HPI    She actually has no complaints today she is on palliative care and has had some trouble with her stoma bleeding as well as some blood in her urine. She really does not want any aggressive therapy done at this time. She has developed anemia from this bleeding. She denies fever chills cough nausea vomiting diarrhea and she says her sugars are good today. Review of Systems    Constitutional: Negative for fatigue, fever and sweats. HEENT: Negative for eye discharge and vision loss. Negative for ear drainage, hearing loss and nasal drainage. Respiratory: Negative for cough, dyspnea and wheezing. Cardiovascular:  Negative for chest pain, claudication and irregular heartbeat/palpitations. Gastrointestinal: Negative for abdominal pain, nausea, constipation and diarrhea. Genitourinary: Negative for dysuria, patient post menopausal.  Metabolic/Endocrine: Negative for cold intolerance, heat intolerance, polydipsia and polyphagia. No unintended weight loss or weight gain. Neuro/Psychiatric: Negative for gait disturbance. Negative for psychiatric symptoms. Dermatologic: Negative for pruritus and rash. Musculoskeletal: Negative for bone/joint symptoms. No numbness or tingling. No loss of function. Hematology: Negative for bleeding and easy bruising. Immunology:  Negative for environmental allergies and food allergies. Physical Exam    Patient's medication, allergies, past medical, surgical, social and family histories were reviewed and updated as appropriate. PHYSICAL EXAM   General Appearance: Alert oriented pleasant cooperative with the exam in no acute distress. HEENT: Eyes clear nonicteric she is somewhat pale facial muscles symmetrical hearing tremendously diminished.   Neck: Soft nontender no adenopathy  Lungs: Clear to auscultation no wheezes rhonchi or rales  Heart: Regular rate and rhythm without murmurs rubs or gallops  Extremities: No rashes or edema she uses a walker to walk    Assessment:  1. Essential hypertension  Controlled at this time   2. Mixed hyperlipidemia     3. Type 2 diabetes mellitus with stage 4 chronic kidney disease, with long-term current use of insulin ([facility])  She's had a lot of lab work done recently and would hold off on doing additional labs at this time plus she is chronically anemic. Just follow her blood sugars at home and have her daughter call us if it to for elevated   4.  Anemia, unspecified type  Check at follow-up               Plan:  Current Outpatient Prescriptions   Medication Sig Dispense Refill    Ciprofloxacin (CIPRO PO) Take 1 tablet by mouth 2 times daily      insulin lispro protamine & lispro (HUMALOG MIX 75/25) (75-25) 100 UNIT per ML SUSP injection vial 13 units with lunch and 12 units with dinner (Patient taking differently: Inject into the skin 2 times daily (with meals) 13 units with lunch and 12 units with dinner) 3 vial 2    torsemide (DEMADEX) 20 MG tablet Take 1 tablet by mouth daily 30 tablet 0    HYDROcodone-acetaminophen (NORCO) 5-325 MG per tablet TAKE 1 TABLET BY MOUTH EVERY 6 HOURS AS NEEDED FOR PAIN  0    acetaminophen (APAP EXTRA STRENGTH) 500 MG tablet Take 1 tablet by mouth every 6 hours as needed for Pain 20 tablet 0    spironolactone (ALDACTONE) 25 MG tablet Take 1 tablet by mouth  daily (Patient taking differently: Take 25 mg by mouth daily Take 1 tablet by mouth  daily) 90 tablet 3    magnesium oxide (MAG-OX) 400 (241.3 MG) MG TABS tablet TAKE 1 TABLET TWICE DAILY FOR 2 DAYS, THEN TAKE 1 TABLET DAILY THEREAFTER  11    Cholecalciferol (VITAMIN D) 2000 UNITS CAPS capsule Take 2,000 Units by mouth daily       aspirin 81 MG tablet Take 81 mg by mouth 2 times daily       nitroGLYCERIN (NITROSTAT) 0.4 MG SL tablet Place 1 tablet under the tongue every 5 minutes as needed. 25 tablet 3    carvedilol (COREG) 6.25 MG tablet Take 6.25 mg by mouth 2 times daily       isosorbide mononitrate (IMDUR) 60 MG CR tablet Take 60 mg by mouth daily       Calcium Carbonate Antacid (TUMS E-X PO) Take 750 tablets by mouth 2 times daily.  ferrous sulfate 325 (65 FE) MG tablet Take 325 mg by mouth daily (with breakfast). No current facility-administered medications for this visit. No orders of the defined types were placed in this encounter. No orders of the defined types were placed in this encounter. Return in about 4 months (around 4/18/2018).     Dr. Caro Elias      12/18/17  4:16 PM

## 2017-12-20 NOTE — ED NOTES
Colostomy bag intact-attempt made to secure seal with karaya seal over existing seal with no signs of leaking at this time. Pts daughter states the supplies sent to this nurse from the 4th floor are not the correct type. Pt uses Choloplast supplies and only Vipin are available at this time. Skin around ostomy bag is sl reddened, advised not to put plastic tape directly on pts skin, good skin care stressed. Pt and daughter understand home care at this time and will continue attempts to have the ostomy nurse come to the house with additional supplies.        Fly Del Toro, CAREY  12/20/17 0575

## 2017-12-20 NOTE — ED NOTES
Colostomy bag on left lower abd with sm amt dk brown stool present. Area around bag is large-pt states she has a hernia that is causing the abdomen to bulge. She states that she has open areas under the seal that she is taking Cipro for at this time. She is on pallative care and has no more bags or supplies at home at this time.        Zane Fry RN  12/19/17 2350

## 2017-12-20 NOTE — ED PROVIDER NOTES
[address] [facility] ED  eMERGENCY dEPARTMENT eNCOUnter      Pt Name: Rafael Bunn  MRN: [id number]  Jackygfnilay 3/9/1929  Date of evaluation: 12/19/2017  Provider: Juno Hernandez MD    CHIEF COMPLAINT           HPI  Rafael Bunn is a 80 y.o. female per chart review has a h/o afib, HTN, hpl, CAD, PUD, cirrhosis, CHF, ostomy bag since 2005 presents to the ED with leakage around her ostomy bag x 3 days. Pt also notes moderate distention of her abdomen x 1 week. No fever, n/v.  Pt notes some sores in her ostomy bag and was recently placed on cipro for this. ROS  Review of Systems   Constitutional: Negative for activity change, chills and fever. HENT: Negative for ear pain and sore throat. Eyes: Negative for visual disturbance. Respiratory: Negative for cough and shortness of breath. Cardiovascular: Negative for chest pain, palpitations and leg swelling. Gastrointestinal: Positive for abdominal distention. Negative for abdominal pain, diarrhea, nausea and vomiting. Leakage from ostomy bag   Genitourinary: Negative for dysuria. Musculoskeletal: Negative for back pain. Skin: Negative for rash. Neurological: Negative for dizziness and weakness. Except as noted above the remainder of the review of systems was reviewed and negative.        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    Cancer of gallbladder ([address]     Chest pain, atypical 2010 AND 2011    CHF (congestive heart failure) ([address]     Cirrhosis ([address]     CHRONIC    Fibromyalgia     GI bleed 09/18/14    Gout 01/2017    H/O: stroke     Hernia of abdominal wall     History of mammography, diagnostic 2011-RIGHT-CAT 2    Hyperlipidemia     Hypertension     Liver disease     LONG TERM ANTICOAGULENT USE     Obesity     OP (osteoporosis) 2005    H/O L1 COMPRESSION FX    Osteoarthritis     Type II or unspecified type diabetes mellitus without mention of complication, not stated as uncontrolled          SURGICAL HISTORY       Past Surgical History:   Procedure Laterality Date    APPENDECTOMY  1945    ARTERIAL BYPASS SURGRY  12/1999    CABG triple by pass    BREAST SURGERY  2003    Left Modified Radical Mastectomy    CARDIAC CATHETERIZATION  2008    COLON SURGERY  05/22/2006    AP resection    CORONARY ARTERY BYPASS GRAFT  1999    CYST REMOVAL  L AXILLA    DILATION AND CURETTAGE OF UTERUS      FOOT SURGERY  RIGHT TUMOR REMOVED    PACEMAKER PLACEMENT  2010    TONSILLECTOMY AND ADENOIDECTOMY  1940    UPPER GASTROINTESTINAL ENDOSCOPY  6/16/13    DR. GASCA    UPPER GASTROINTESTINAL ENDOSCOPY  09/19/2014    Dr. Jamaica Arizmendi       Discharge Medication List as of 12/20/2017  1:05 AM      CONTINUE these medications which have NOT CHANGED    Details   Ciprofloxacin (CIPRO PO) Take 1 tablet by mouth 2 times dailyHistorical Med      insulin lispro protamine & lispro (HUMALOG MIX 75/25) (75-25) 100 UNIT per ML SUSP injection vial 13 units with lunch and 12 units with dinner, Disp-3 vial, R-2Normal      torsemide (DEMADEX) 20 MG tablet Take 1 tablet by mouth daily, Disp-30 tablet, R-0Normal      HYDROcodone-acetaminophen (NORCO) 5-325 MG per tablet TAKE 1 TABLET BY MOUTH EVERY 6 HOURS AS NEEDED FOR PAIN, R-0      acetaminophen (APAP EXTRA STRENGTH) 500 MG tablet Take 1 tablet by mouth every 6 hours as needed for Pain, Disp-20 tablet, R-0      spironolactone (ALDACTONE) 25 MG tablet Take 1 tablet by mouth  daily, Disp-90 tablet, R-3      magnesium oxide (MAG-OX) 400 (241.3 MG) MG TABS tablet TAKE 1 TABLET TWICE DAILY FOR 2 DAYS, THEN TAKE 1 TABLET DAILY THEREAFTER, R-11      Cholecalciferol (VITAMIN D) 2000 UNITS CAPS capsule Take 2,000 Units by mouth daily Historical Med      aspirin 81 MG tablet Take 81 mg well-developed. HENT:   Head: Normocephalic. Right Ear: External ear normal.   Left Ear: External ear normal.   Mouth/Throat: Oropharynx is clear and moist.   Eyes: Conjunctivae are normal. Pupils are equal, round, and reactive to light. Neck: Normal range of motion. Neck supple. Cardiovascular: Normal rate, regular rhythm and normal heart sounds. Pulmonary/Chest: Effort normal and breath sounds normal.   Abdominal: Bowel sounds are normal.   Ostomy noted in LLQ. Ab soft, moderate distention. Musculoskeletal: Normal range of motion. Neurological: She is alert and oriented to person, place, and time. Skin: Skin is warm and dry. Psychiatric: She has a normal mood and affect. Nursing note and vitals reviewed. MDM  81 yo female presents to the ED with ab distention, ostomy bag leakage. Pt given 1 L NS in the ED. Labs only remarkable Cr 1.59, glucose 136, AST 36.  CT shows L colectomy with large hernia defect containing small bowel and colon and ostomy site. No evidence of SBO. Age intermittent mid sacral fx. Remote L1 fx. Pt educated the results and states that she knew about the hernia and that she fell a couple months ago and figured her sacrum was broken however did not want pain meds or help. Plan was to admit the pt however pt wanted to go home. Pt has f/u with her ostomy nurse tomorrow. No signs of leakage of ostomy in the ED. Pt given ostomy bag and SBO warning signs, will continue her cipro and f/u with pcp and surgery. Pt and family understand plan. FINAL IMPRESSION      1.  Colostomy complication ([address]    2. Closed nondisplaced zone II fracture of sacrum, initial encounter [facility])          DISPOSITION/PLAN   DISPOSITION Decision To Discharge 12/20/2017 01:20:00 AM        DISCHARGE MEDICATIONS:  Discharge Medication List as of 12/20/2017  1:05 AM               Giovanny Zaman MD (electronically signed)  Attending Emergency Physician            Giovanny Zaman MD  12/21/17 5015

## 2017-12-20 NOTE — ED NOTES
Carl 1.8 reported to Dr Caban Neighbor, call to CT spoke with Heber Josue. NO Contrast for CT scan.      Jose Haas RN  12/19/17 9799

## 2017-12-22 NOTE — PROGRESS NOTES
Wound Ostomy Continence Nurse                                                                                Ostomy Referral Progress Note      NAME:  Kai Rogel  MEDICAL RECORD NUMBER:  [id number]  AGE: 80 y.o.    GENDER:  female  :  3/9/1929  TODAY'S DATE:  2017    Subjective     Kai Rogel is a 80 y.o. female referred by:   [x] Physician  [] Nursing  [] Other:      Colostomy and Established    PAST MEDICAL HISTORY:        Diagnosis Date    Allergic rhinitis     Anemia     [facility]     CA - cancer of rectum     CAD (coronary artery disease)     Cancer ([facility] -L BREAST DUCTAL    Cancer of gallbladder ([facility]     Chest pain, atypical  AND     CHF (congestive heart failure) ([facility]     Cirrhosis ([facility]     CHRONIC    Fibromyalgia     GI bleed 14    Gout 2017    H/O: stroke     Hernia of abdominal wall     History of mammography, diagnostic -RIGHT-CAT 2    Hyperlipidemia     Hypertension     Liver disease     LONG TERM ANTICOAGULENT USE     Obesity     OP (osteoporosis)     H/O L1 COMPRESSION FX    Osteoarthritis     Type II or unspecified type diabetes mellitus without mention of complication, not stated as uncontrolled        MEDICATIONS:    Current Outpatient Prescriptions on File Prior to Encounter   Medication Sig Dispense Refill    Ciprofloxacin (CIPRO PO) Take 1 tablet by mouth 2 times daily      insulin lispro protamine & lispro (HUMALOG MIX 75/25) (75-25) 100 UNIT per ML SUSP injection vial 13 units with lunch and 12 units with dinner (Patient taking differently: Inject into the skin 2 times daily (with meals) 13 units with lunch and 12 units with dinner) 3 vial 2    torsemide (DEMADEX) 20 MG tablet Take 1 tablet by mouth daily 30 tablet 0    HYDROcodone-acetaminophen (NORCO) 5-325 MG per tablet TAKE 1 TABLET BY MOUTH EVERY 6 HOURS AS NEEDED FOR PAIN  0    acetaminophen (APAP EXTRA STRENGTH) 500 MG tablet Take 1 tablet by mouth every 6 hours as needed for Pain 20 tablet 0    spironolactone (ALDACTONE) 25 MG tablet Take 1 tablet by mouth  daily (Patient taking differently: Take 25 mg by mouth daily Take 1 tablet by mouth  daily) 90 tablet 3    magnesium oxide (MAG-OX) 400 (241.3 MG) MG TABS tablet TAKE 1 TABLET TWICE DAILY FOR 2 DAYS, THEN TAKE 1 TABLET DAILY THEREAFTER  11    Cholecalciferol (VITAMIN D) 2000 UNITS CAPS capsule Take 2,000 Units by mouth daily       aspirin 81 MG tablet Take 81 mg by mouth 2 times daily       nitroGLYCERIN (NITROSTAT) 0.4 MG SL tablet Place 1 tablet under the tongue every 5 minutes as needed. 25 tablet 3    carvedilol (COREG) 6.25 MG tablet Take 6.25 mg by mouth 2 times daily       isosorbide mononitrate (IMDUR) 60 MG CR tablet Take 60 mg by mouth daily       Calcium Carbonate Antacid (TUMS E-X PO) Take 750 tablets by mouth 2 times daily.  ferrous sulfate 325 (65 FE) MG tablet Take 325 mg by mouth daily (with breakfast). No current facility-administered medications on file prior to encounter.         ALLERGIES:    Allergies   Allergen Reactions    Amaryl [Glimepiride]     Avelox [Moxifloxacin Hcl In Nacl]     Bactrim     Darvon [Propoxyphene Hcl]      \"FEEL WEIRD\"    Glynase [Glyburide]     Pcn [Penicillins]     Starlix [Nateglinide] Nausea Only    Sulfamethoxazole-Trimethoprim     Tavist  [Clemastine]     Tavist-D [[employer] Dayhist-D]      MOST DECONGESTANTS    Amoxicillin Rash       PAST SURGICAL HISTORY:    Past Surgical History:   Procedure Laterality Date    APPENDECTOMY  1945    ARTERIAL BYPASS SURGRY  12/1999    CABG triple by pass    BREAST SURGERY  2003    Left Modified Radical Mastectomy    CARDIAC CATHETERIZATION  2008    COLON SURGERY  05/22/2006    AP resection    CORONARY ARTERY BYPASS GRAFT  1999    CYST REMOVAL  L AXILLA    DILATION AND CURETTAGE OF UTERUS      FOOT SURGERY  RIGHT TUMOR REMOVED    PACEMAKER PLACEMENT  2010    TONSILLECTOMY AND ADENOIDECTOMY  1940    UPPER GASTROINTESTINAL ENDOSCOPY  6/16/13    DR. GASCA    UPPER GASTROINTESTINAL ENDOSCOPY  09/19/2014    Dr. Michelle Scheuermann:    family history includes Cancer in her brother and sister; Diabetes in her brother and mother; Heart Disease in her brother, mother, and sister; High Blood Pressure in her brother, mother, and another family member; Stroke in her brother. SOCIAL HISTORY:    Social History   Substance Use Topics    Smoking status: Former Smoker     Types: Cigarettes     Quit date: 1/1/1970    Smokeless tobacco: Never Used    Alcohol use No       LABS:  WBC:    Lab Results   Component Value Date    WBC 6.2 12/19/2017     H/H:    Lab Results   Component Value Date    HGB 12.0 12/19/2017    HCT 36.7 12/19/2017     BMP:    Lab Results   Component Value Date     12/19/2017    K 4.5 12/19/2017    CL 93 12/19/2017    CO2 31 12/19/2017    BUN 43 12/19/2017    LABALBU 3.5 12/19/2017    LABALBU 3.6 05/18/2012    CREATININE 1.8 12/19/2017    CREATININE 1.59 12/19/2017    CALCIUM 9.3 12/19/2017    GFRAA 32 12/19/2017    LABGLOM 27 12/19/2017    GLUCOSE 136 12/19/2017    GLUCOSE 114 05/18/2012     PTT:    Lab Results   Component Value Date    APTT 24.8 09/22/2016    PTT 32.0 08/27/2012   [APTT}  PT/INR:    Lab Results   Component Value Date    PROTIME 10.8 09/22/2016    PROTIME 30.5 08/26/2014    INR 1.0 09/22/2016       Objective    There were no vitals taken for this visit. [place] Risk Score      Assessment   Surgeon - Dr. Helen Gonzalez    Colostomy       Patient known to this [id number]  Nurse, Last seen in September. Patient having difficulty at this time with leaking appliance, related to large dustin stomal hernia. Patient's daughter frustrated, as she has had to change the appliance at least 2x each day this past week.  Patient and daughter say that there were no issues with changing the appliance up until this past week - prior she was getting approximately 5-7 days of wear time for each appliance. Just looking at patient in a seat position, appears that dustin stomal hernia has gotten larger since the last visit. Patient's daughter states Dr. Griselda Black knows of the hernia - patient saw him in the hospital not long ago and patients daughter states \"dr. Griselda Black blew us off and said everything was fine. \"    Old appliance removed at this time. Colostomy in LLQ of the abdomen. Stoma is red, moist, and is flush. Dustin stomal skin is red, denuded and patient complains of \"burning pain. \" Patient's abdomen assessed in a sitting, standing, and laying position - patient found to have a deep dip in contour at approximately 6 o'clock to 8 o'clock - patient's daughter verifies that this is the location of leaking occurrence. Patient also has a small open wound to the peristomal area at approximately 2 o'clock - measures 1 x 1 x 0.1 with clean red base, small amount of serious drainage, no s/sx of infection - although seen by PCP this week and is now on an antibiotic. Patient need for deeper convex system to accommodate the uneven contour next to stoma - convexity must be flexible, as the peristomal hernia is large and patient states she can see and fel it moving. Selecting a Coloplast Casper Convex Deep, one-piece, drainable. All dustin stomal skin, including the wound, cleanse thoroughly and then dried. Small piece of Acquacel Ag cut to fit the wound bed and set in place. Use of a small piece of protective sheet then used to secure dressing in place. Next, use of Stoma powder applied to all denuded dustin stomal skin and excess brushed off. Then use of a skin prep wipe used after. Piece of moldable ring cut and molded to fill the deep dip where leaking has been occurring and smoothed - creation of a flat pouching surface. Appliance cut to 1-1/8\" (size of stoma) and abdominal contour smooth and appliance placed.  Used warmth of hand to active the adhesives in dressing. Lastly, ostomy belt for Sen[employer] [place] applied at this time. Patient's daughter, Jackie Arias, at bedside. Jackie Arias is patient's primary caregiver. Jackie Arias and patient both educated on wound care, deep convex system, dustin stomal skin care, appliance of appliance and accessory products, and s/sx of infection. patient and daughter also educated on looking into acquiring a hernia belt - patient very resistant to this idea, but information provided at this time       No intake or output data in the 24 hours ending 12/22/17 1107    DATE OF LAST BM=  Colostomy is functioning. Plan   Plan for Ostomy Care: see above. F/u as needed      Ostomy Plan of Care  [] Supplies/Instructions left in room  [] Patient using home supplies  [] Brand/supplies at bedside - Coloplast Sensura Casper Convex Deep, ostomy belt for [place]; moldable ring, Acquacel Ag, Protective sheet, stoma powder, liquid barrier prep wipes.     Current Diet:    Dietician consult:  N/A    Discharge Plan:  Placement for patient upon discharge: home with support    Outpatient visit plan No  Supplies given Yes   Samples requested Yes    [facility]  [facility]   [facility]  [] Supplies  [] Other      Patient/Caregiver Teaching:  Written Instructions given to patient/family: patient and patient's daughter  Teaching provided:  [x] Reviewed GI and A&P        [x] Supplies  [x] Pouch emptying      [x] Manipulate closure  [x] Routine Care         [x] Comment: use of hernia belt; accessory products  [x] Pouch maintenance           Level of patient/caregiver understanding able to:  [] Indicates understanding       [] Needs reinforcement  [] Unsuccessful      [x] Verbal Understanding  [x] Demonstrated understanding       [] No evidence of learning  [] Refused teaching         [] N/A    Electronically signed by LOBITO SantizoN, RN, CWOCN on 12/22/2017 at 11:07 AM

## 2017-12-28 PROBLEM — L98.491 SKIN ULCER OF ABDOMEN, LIMITED TO BREAKDOWN OF SKIN (HCC): Status: ACTIVE | Noted: 2017-01-01

## 2017-12-28 NOTE — FLOWSHEET NOTE
Admission assessment completed. Pt A&Ox2. To self and place. Pt is forgetful. Home meds review with daughter and daughter will bring in meds in am. Pt denies pain, nausea, sob. Pt skin intact. Pt up with walker. Pt steady on feet. Pt has colostomy. 3 ulcers around stoma. Stoma beefy red. Stoma sight bleeding. New colostomy bag applied. tegaderm and gauze applied to open area around stoma. Bed alarm on. Call light within reach. Will continue to monitor. Electronically signed by Jeremi Quinn RN on 12/28/2017 at 4:53 AM

## 2017-12-28 NOTE — H&P
Hospital Medicine  History and Physical    Patient:  Severiano Mam  MRN: [id number]    CHIEF COMPLAINT:    Chief Complaint   Patient presents with    Other     colostomy issues. Sqaud stated family notice blood in colostomy bag and redness around stoma. Bag was changed today. History Obtained From:  patient, electronic medical record  Primary Care Physician: Kelly Peralta MD    HISTORY OF PRESENT ILLNESS:   The patient is a 80 y.o. female presenting to the ED with induration and ulceration of her periostomal area. She has had bleeding into ostomy bag, noted today during bag changes, and family noted significant excoriation around the ostomy site, with areas of ulceration. Family was unable to dress the area due to ulcerations, and brought her in for evaluation. She follows with Dr. Jose Bowles and wound care for ostomy site management. Patient denies any fevers/chills, sob, CP, SOLORZANO, N/V, although admits to pain at ostomy site. Surgery was not able to be contacted overnight, so she was admitted to medical service for wound evaluation until surgical consult could be obtained.      Past Medical History:      Diagnosis Date    Allergic rhinitis     Anemia     Atrial fibrillation (HCC)     CA - cancer of rectum     CAD (coronary artery disease)     Cancer (HCC) 2002-L BREAST DUCTAL    Cancer of gallbladder (HCC)     Chest pain, atypical 2010 AND 2011    [facility]     Cirrhosis ([facility]     CHRONIC    Fibromyalgia     GI bleed 09/18/14    Gout 01/2017    H/O: stroke     Hernia of abdominal wall     History of mammography, diagnostic 2011-RIGHT-CAT 2    Hyperlipidemia     Hypertension     Liver disease     LONG TERM ANTICOAGULENT USE     Obesity     OP (osteoporosis) 2005    H/O L1 COMPRESSION FX    Osteoarthritis     Type II or unspecified type diabetes mellitus without mention of complication, not stated as uncontrolled        Past Surgical History:      Procedure Laterality Date    APPENDECTOMY  1945    ARTERIAL BYPASS [address]  12/1999    CABG triple by pass    BREAST SURGERY  2003    Left Modified Radical Mastectomy    CARDIAC CATHETERIZATION  2008    COLON SURGERY  05/22/2006    AP resection    CORONARY ARTERY BYPASS GRAFT  1999    CYST REMOVAL  L AXILLA    DILATION AND CURETTAGE OF UTERUS      FOOT SURGERY  RIGHT TUMOR REMOVED    PACEMAKER PLACEMENT  2010    TONSILLECTOMY AND ADENOIDECTOMY  1940    UPPER GASTROINTESTINAL ENDOSCOPY  6/16/13    DR. GASCA    UPPER GASTROINTESTINAL ENDOSCOPY  09/19/2014    Dr. Mario Bradley       Medications Prior to Admission:    Prior to Admission medications    Medication Sig Start Date End Date Taking? Authorizing Provider   insulin lispro protamine & lispro (HUMALOG MIX 75/25) (75-25) 100 UNIT per ML SUSP injection vial 13 units with lunch and 12 units with dinner  Patient taking differently: Inject into the skin 2 times daily (with meals) 13 units with lunch and 12 units with dinner 9/18/17  Yes Elenita Sorensen PA-C   torsemide (DEMADEX) 20 MG tablet Take 1 tablet by mouth daily 7/18/17  Yes Ibrahima Clemons MD   HYDROcodone-acetaminophen (NORCO) 5-325 MG per tablet TAKE 1 TABLET BY MOUTH EVERY 6 HOURS AS NEEDED FOR PAIN 12/29/16  Yes Historical Provider, MD   spironolactone (ALDACTONE) 25 MG tablet Take 1 tablet by mouth  daily  Patient taking differently: Take 25 mg by mouth daily Take 1 tablet by mouth  daily 12/29/16  Yes Ibrahima Clemons MD   magnesium oxide (MAG-OX) 400 (241.3 MG) MG TABS tablet TAKE 1 TABLET TWICE DAILY FOR 2 DAYS, THEN TAKE 1 TABLET DAILY THEREAFTER 9/18/16  Yes Historical Provider, MD   Cholecalciferol (VITAMIN D) 2000 UNITS CAPS capsule Take 2,000 Units by mouth daily    Yes Historical Provider, MD   aspirin 81 MG tablet Take 81 mg by mouth 2 times daily  8/20/10  Yes Historical Provider, MD   nitroGLYCERIN (NITROSTAT) 0.4 MG SL tablet Place 1 tablet under the tongue every 5 minutes as needed. normal. No rashes or lesions  Eyes:Eye: Normal external eye, conjunctiva, GABBY. ENT: Head: Normocephalic, no lesions, without obvious abnormality. Neck: no adenopathy, no carotid bruit, no JVD, supple, symmetrical, trachea midline and thyroid not enlarged, symmetric, no tenderness/mass/nodules  Respiratory: clear to auscultation bilaterally  Cardiovascular: regular rate and rhythm, S1, S2 normal, no murmur, click, rub or gallop  Gastrointestinal: soft, non-tender; bowel sounds normal; large ventral hernia around ostomy site, with 3 ulcerated skin lesions around ostomy. Genitourinary: Deferred  Musculoskeletal:extremities normal, atraumatic, no cyanosis or edema  Neurologic: Mental status AAOx3 No facial asymmetry or droop. Normal muscle strength b/l. Psychiatric: Appropriate mood and affect. Poor insight and judgement  Hematologic: No obvious bruising or bleeding    Recent Labs      12/28/17   0118   WBC  6.4   HGB  11.5*   PLT  105*     Recent Labs      12/28/17   0118   NA  134   K  4.4   CL  94*   CO2  32*   BUN  42*   CREATININE  1.51*   GLUCOSE  250*   AST  32   ALT  15   BILITOT  0.6   ALKPHOS  166*     Assessment and Plan   1. Roxy-ostomal Skin ulceration and excoriation: consult to general surgery and wound care. Currently will dress ulcerations with 2x2 and cover area with Tegaderm, with ostomy bag over that. 2. DMII: continue home insulin regimen for now  3. CKD: continue spironolactone, Cr around baseline. Hold demadex at this time in the event procedure will be needed  4. HTN: cont coreg and imdur. 5. DLD complicated by CAD: continue beta blocker, imdur and asa  6. Cirrhosis: continue spironolactone  7. Afib: continue coreg, no chronic anticoagulation  8.  DVT PPX    Patient Active Problem List   Diagnosis Code    Essential hypertension I10    Mixed hyperlipidemia E78.2    CAD (coronary artery disease) I25.10    CRF (chronic renal failure) N18.9    PUD (peptic ulcer disease) K27.9    A-fib

## 2017-12-28 NOTE — DISCHARGE SUMMARY
20 tablet, Refills: 0           Activity: activity as tolerated  Diet: cardiac diet and diabetic diet  Wound Care: as directed    Disposition: home  Follow-up with pcp and Gen surgery in 1 week. Labs: as needed per PCP upon follow up visit. Follow-up visits:   Harry Maharaj MD  [place] [address]  [phone]    Schedule an appointment as soon as possible for a visit  for follow up    Esperanza Mccormack NP  [address]  [phone]    On 1/15/2018  [facility]  follow up home visit is scheduled Monday, 1-15-18 at 3:00 pm with Doris Ashton CNP.     Varinder Terry, RN      [phone]    Carlitos Enamorado MD  [address]  [address] [facility]  [phone]      for follow up, If symptoms worsen       Note that less than 30 minutes was spent in preparing discharge papers, discussing discharge with patient, medication review, etc.    Electronically signed by Charli Chavez MD on 12/28/2017 at 1:46 PM

## 2017-12-28 NOTE — ED PROVIDER NOTES
are multiple skin tear areas around the adhesive tape that was used to fixate the colostomy bag in place so it did not fall off that has bleeding, erythema and multiple skin tears and ulcerations. There is a wound about the size of a dime that is erythematous inside the wound and around the wound with some serous drainage and purulent drainage that I wound culture was ordered and obtained and sent to the lab for evaluation. I  Ordered labs to rule out leukocytosis or electrolyte abnormalities. Patient's labs show no leukocytosis and renal failure that has been present on past labs that has not significantly changed. This patient will need to be admitted for further evaluation of skin breakdown and I have discussed this patient with Dr. Robert Casillas who will be the admitting physician for this patient. I have discussed the admission with the patient and her daughter who have no further questions comments or concerns at this time. CRITICAL CARE TIME     Total Critical Care time (not applicable if blank)     Total minutes, excluding separately reportable procedures. There was a high probability of clinically significant/life threatening deterioration in the patient's condition which required my urgent intervention. This includes discussing the case with consultants, reviewing laboratory studies and images independently, arranging disposition, and speaking with patient/family    CONSULTS:  [address] [facility]ULT TO GENERAL SURGERY    PROCEDURES:  Unless otherwise noted below, none     Procedures    FINAL IMPRESSION      1.  Skin ulcer, limited to breakdown of skin [facility])          DISPOSITION/PLAN   DISPOSITION Admitted 12/28/2017 02:44:33 AM      PATIENT REFERRED TO:  Zeny Jones MD  [address] [place] [id number]  [phone]            DISCHARGE MEDICATIONS:  New Prescriptions    No medications on file          (Please note that portions of this note were completed with

## 2017-12-28 NOTE — PROGRESS NOTES
PACEMAKER PLACEMENT  2010    TONSILLECTOMY AND ADENOIDECTOMY  1940    UPPER GASTROINTESTINAL ENDOSCOPY  6/16/13    DR. GASCA    UPPER GASTROINTESTINAL ENDOSCOPY  09/19/2014    Dr. Sebastian Phillips:    family history includes Cancer in her brother and sister; Diabetes in her brother and mother; Heart Disease in her brother, mother, and sister; High Blood Pressure in her brother, mother, and another family member; Stroke in her brother.     SOCIAL HISTORY:    Social History   Substance Use Topics    Smoking status: Former Smoker     Types: Cigarettes     Quit date: 1/1/1970    Smokeless tobacco: Never Used    Alcohol use No       LABS:  WBC:    Lab Results   Component Value Date    WBC 6.4 12/28/2017     H/H:    Lab Results   Component Value Date    HGB 11.5 12/28/2017    HCT 34.6 12/28/2017     BMP:    Lab Results   Component Value Date     12/28/2017    K 4.4 12/28/2017    CL 94 12/28/2017    CO2 32 12/28/2017    BUN 42 12/28/2017    LABALBU 3.4 12/28/2017    LABALBU 3.6 05/18/2012    CREATININE 1.51 12/28/2017    CALCIUM 8.9 12/28/2017    GFRAA 39.3 12/28/2017    LABGLOM 32.5 12/28/2017    GLUCOSE 250 12/28/2017    GLUCOSE 114 05/18/2012     PTT:    Lab Results   Component Value Date    APTT 24.8 09/22/2016    PTT 32.0 08/27/2012   [APTT}  PT/INR:    Lab Results   Component Value Date    PROTIME 10.8 09/22/2016    PROTIME 30.5 08/26/2014    INR 1.0 09/22/2016       Objective    BP (!) 151/58   Pulse 59   Temp 98.2 °F (36.8 °C) (Oral)   Resp 18   Ht 5' 3\" (1.6 m)   Wt 159 lb (72.1 kg)   SpO2 98%   BMI 28.17 kg/m²     Trenton Risk Score Trenton Scale Score: 19    Assessment   Surgeon - Dr. Aric Trinidad    Colostomy      Colostomy LLQ (Active)   Stomal Appliance Changed 12/28/2017 10:20 AM   Flange Size (inches) 1.5 Inches 12/28/2017 10:20 AM   Stoma  Assessment Red;Moist;Flush 12/28/2017 10:20 AM   Mucocutaneous Junction Intact 12/28/2017 10:20 AM   Peristomal Assessment Denuded 12/28/2017 10:20 AM   Treatment Bag change;Site care;Stoma paste;Stoma powder;Liquid skin barrier 12/28/2017 10:20 AM   Stool Appearance Soft 12/28/2017 10:20 AM   Stool Color Brown 12/28/2017 10:20 AM   Stool Amount Medium 12/28/2017 10:20 AM   Number of days: 2722     Patient known to this [address] [place] [address] Nurse - last seen Friday, 12/22/2017 as an outpatient for dustin stomal wound and inability to keep appliance on. Spoke with patient's daughter Lincoln Merrill this morning, who assist patient with ostomy care. Per daughter Tiesha Nataliia, there was a lot of bleed from the stoma and she also states that the appliance \"blew off after 3 days. \" During last visit, this [address] [place] [address] Nurse stressed to patient and daughter that the appliance will need to be change more frequently, about q 3-4 days due to dustin stomal wound/ irritation. Also stressed during last visit that the patient needs to wear a support belt and or have a hernia belt order. This [address] [place] [address] Nurse provided patient with an ostomy support belt during last visit and educated patient and daughter on use of belt and why it will help the patient - belt supports appliance and keeps it close to the body with movents, which patient would highly benefit from due to large peristomal hernia. Also noted last visit that patient and daughter state that dustin stomal hernia has gotten larger, but patient remains a poor candidate for surgery. This [address] [place] [address] Nurse fitted patient for a flexible deep convex appliance during last visit. Patient needs flexible appliance do to dustin stomal hernia, and need for convexity due to flush stoma and dip to the dustin stomal contour. At this time patient expresses to this [address] [place] [address] nurse that the appliance that was put on on Friday work well for her at home (method was taught to Daughter Tiesha William and patient during that visit). Patient states it stayed on well and there weren't any issues. Appliance removed a this time.  Dr. Josefa Buck request phot to be take - patient gives verbal consent and photo (cm) 0.7 cm 12/28/2017 10:20 AM   Wound Depth (cm)  0.2 12/28/2017 10:20 AM   Calculated Wound Size (cm^2) (l*w) 0.7 cm^2 12/28/2017 10:20 AM   Wound Assessment Clean;Drainage;Red 12/28/2017 10:20 AM   Drainage Amount Scant 12/28/2017 10:20 AM   Drainage Description Serous 12/28/2017 10:20 AM   Odor None 12/28/2017 10:20 AM   Roxy-wound Assessment Painful; Induration 12/28/2017 10:20 AM   Red%Wound Bed 100 12/28/2017 10:20 AM   Number of days: 0             No intake or output data in the 24 hours ending 12/28/17 1204    DATE OF LAST BM=  Colostomy is functioning    Plan   Plan for Ostomy Care: See above. Same as last Friday, patient states appliance worked well for her at home. Patient and patient's daughter have all supplies and product numbers from last visit.  Photo take for Dr. Saintclair Otter to evaluate      Ostomy Plan of Care  [] Supplies/Instructions left in room  [x] Patient using home supplies  [x] Brand/supplies at bedside - patient using home supplies, coloplast Sen[employer] Convex Deep    Current Diet: DIET GENERAL;  Dietician consult:  No    Discharge Plan:  Placement for patient upon discharge: skilled nursing    Outpatient visit plan No  Supplies given Yes   Samples requested Yes    Referrals:  []   [] [facility]  [] Supplies  [] Other      Patient/Caregiver Teaching:  Written Instructions given to patient/family: patient  Teaching provided:  [] Reviewed GI and A&P        [x] Supplies  [] Pouch emptying      [x] Manipulate closure  [x] Routine Care         [] Comment  [x] Pouch maintenance           Level of patient/caregiver understanding able to: Patient's daughter educated 12/22  [] Indicates understanding       [] Needs reinforcement  [] Unsuccessful      [x] Verbal Understanding  [x] Demonstrated understanding       [] No evidence of learning  [] Refused teaching         [] N/A    Electronically signed by NAMAN Romero, RN, Skye Cabrera on 12/28/2017 at [address] [place] [address]

## 2017-12-29 NOTE — TELEPHONE ENCOUNTER
Attempted to contact patient for HOSP follow up. Pt was admitted to [facility] from  12-28-17for periostomal  skin ulcer. Called Pt to Schedule a MIKAEL Appt with Cassi Porter MD , Review Discharge Instructions, and Reconcile Medications. Unable to reach patient by phone. Message left regarding the purpose of this call. Number provided and call back requested.

## 2017-12-29 NOTE — CONSULTS
Ana Menchaca [address]                       [address], [address] [facility]                                   CONSULTATION    PATIENT NAME: Linda Melendez               :        1929  MED REC NO:   [id number]                            ROOM:       [id number]  ACCOUNT NO:   [de-identified]                           ADMIT DATE: 2017  PROVIDER:     Ankita Samson MD    CONSULT DATE:  2017    REASON FOR CONSULTATION:  Parastomal ulcerations. HISTORY:  The patient is a 55-year-old female who is well known to me whom I  saw in the office approximately 4 months ago for the same problem. She  has been followed on a routine basis by Remington Dominguez, the wound nurse and  multiple different treatments have been tried to help her heal this up. Her wound  ulcer and parastomal ulcerations have been stable during this time. She  has had occasionally bleeding. Apparently she  had just seen Remington Dominguez about 5 days ago and the caregiver brought her in to  the emergency room yesterday because of some issues with not being able to  place the bag on properly. She was admitted through the emergency room but  neither myself nor Dr. Piter Easton was called on the patient prior to her  being admitted. She has no fevers. She has no white elevated white blood  cell count. The patient was seen by the stoma therapist already and the  wounds are unchanged from the previous visits. She is complaining of mild discomfort associated with it. She also has a known large  parastomal hernia which we have elected in the past to do nothing with  since she is on palliative care and her wounds have been treated with  topical treatments by the wound care nurse. PAST MEDICAL HISTORY:  The patient has a history of rectal CA and also  coronary artery disease, anemia, atrial fibrillation and does take blood  thinners.   She has also had cancer of the gallbladder, congestive heart  failure, cirrhosis of the liver, spine fractures and type 2 diabetes  mellitus. PAST SURGICAL HISTORY:   Includes an appendectomy in 1945. Coronary artery  bypass in 1999, left modified radical mastectomy in 2003, AP resection in  2006. She has had a pacemaker in 2010, tonsillectomy and adenoidectomy in  1940. She has had EGD done by Dr. Crys Napoles in 2013, as well as 2014. MEDICATIONS:  Her medications on admission included Tylenol 500 mg every 6  hours as needed, aspirin 81 mg twice a day. Tums to be taken two times a  day, Coreg 6.25 mg twice a day, vitamin D 2000 units a day, Cipro 250 mg  twice a day, ferrous sulfate 325 mg daily Norco 5/325 1 tablet every 6  hours as needed, Humalog Mix insulin 13 units at lunch and 12 units at  dinner, Imdur 60 mg controlled release daily, magnesium oxide 400 mg twice  a day, nitroglycerin tablets 0.4 mg sublingually p.r.n., Aldactone 25 mg  daily and Demadex 20 mg a day. ALLERGIES:  She is allergic to AMARYL, AVELOX, BACTRIM, DARVON, GLYNASE,  PENICILLIN, STARLIX, BACTRIM, TAVIST, and OXACILLIN. FAMILY HISTORY:  Her family history is reviewed and noncontributory. SOCIAL HISTORY:  She is . No history of alcohol or drug abuse. He  was a former smoker who quit 45 years ago. REVIEW OF SYSTEMS:  Fourteen systems reviewed and essentially negative  other than some abdominal pain discomforts and her wound around her stoma. PHYSICAL EXAMINATION:  VITAL SIGNS:  Blood pressure was 109/41, pulse 59, respirations 18,  temperature 98.1. She is an elderly very pleasant female who is in no  acute distress. HEAD, EYES, EARS, NOSE, AND THROAT:  Normocephalic. No scleral icterus  present. Mucous membranes are moist.  Pupils equal, round and reactive to  light and accommodation. LUNGS:  No respiratory distress and clear bilaterally. NECK:  Supple without any masses. No thyromegaly and trachea is midline.   ABDOMEN:  She has a large parastomal hernia with some mild tenderness  associated with

## 2018-01-01 ENCOUNTER — OFFICE VISIT (OUTPATIENT)
Dept: INTERNAL MEDICINE CLINIC | Age: 83
End: 2018-01-01
Payer: MEDICARE

## 2018-01-01 ENCOUNTER — HOSPITAL ENCOUNTER (EMERGENCY)
Age: 83
Discharge: HOME | End: 2018-05-30
Attending: EMERGENCY MEDICINE
Payer: MEDICARE

## 2018-01-01 ENCOUNTER — HOSPITAL ENCOUNTER (EMERGENCY)
Age: 83
Discharge: HOME | End: 2018-04-07
Attending: EMERGENCY MEDICINE
Payer: MEDICARE

## 2018-01-01 ENCOUNTER — TELEPHONE (OUTPATIENT)
Dept: PALLATIVE CARE | Age: 83
End: 2018-01-01

## 2018-01-01 ENCOUNTER — HOSPITAL ENCOUNTER (INPATIENT)
Age: 83
LOS: 4 days | Discharge: NURSING, REHAB OR LONG-TERM CARE | DRG: 813 | End: 2018-04-17
Attending: INTERNAL MEDICINE | Admitting: INTERNAL MEDICINE
Payer: MEDICARE

## 2018-01-01 ENCOUNTER — HOSPITAL ENCOUNTER (OUTPATIENT)
Dept: CARDIOLOGY | Age: 83
Discharge: HOME | End: 2018-01-15
Payer: MEDICARE

## 2018-01-01 ENCOUNTER — APPOINTMENT (OUTPATIENT)
Dept: GENERAL RADIOLOGY | Age: 83
DRG: 813 | End: 2018-01-01
Payer: MEDICARE

## 2018-01-01 ENCOUNTER — HOSPITAL ENCOUNTER (INPATIENT)
Age: 83
LOS: 11 days | Discharge: HOME HEALTH | DRG: 091 | End: 2018-04-28
Attending: PHYSICAL MEDICINE & REHABILITATION | Admitting: PHYSICAL MEDICINE & REHABILITATION
Payer: MEDICARE

## 2018-01-01 ENCOUNTER — TELEPHONE (OUTPATIENT)
Dept: INTERNAL MEDICINE CLINIC | Age: 83
End: 2018-01-01

## 2018-01-01 ENCOUNTER — OFFICE VISIT (OUTPATIENT)
Dept: PALLATIVE CARE | Age: 83
End: 2018-01-01
Payer: MEDICARE

## 2018-01-01 ENCOUNTER — CARE COORDINATION (OUTPATIENT)
Dept: CASE MANAGEMENT | Age: 83
End: 2018-01-01

## 2018-01-01 ENCOUNTER — HOSPITAL ENCOUNTER (OUTPATIENT)
Dept: CARDIOLOGY | Age: 83
Discharge: HOME | End: 2018-05-08
Payer: MEDICARE

## 2018-01-01 ENCOUNTER — HOSPITAL ENCOUNTER (INPATIENT)
Age: 83
LOS: 2 days | Discharge: HOME | DRG: 393 | End: 2018-05-29
Attending: EMERGENCY MEDICINE | Admitting: INTERNAL MEDICINE
Payer: MEDICARE

## 2018-01-01 ENCOUNTER — APPOINTMENT (OUTPATIENT)
Dept: CT IMAGING | Age: 83
DRG: 393 | End: 2018-01-01
Payer: MEDICARE

## 2018-01-01 ENCOUNTER — APPOINTMENT (OUTPATIENT)
Dept: GENERAL RADIOLOGY | Age: 83
DRG: 393 | End: 2018-01-01
Payer: MEDICARE

## 2018-01-01 ENCOUNTER — OFFICE VISIT (OUTPATIENT)
Dept: PALLATIVE CARE | Age: 83
End: 2018-01-01

## 2018-01-01 ENCOUNTER — TELEPHONE (OUTPATIENT)
Dept: PHARMACY | Facility: CLINIC | Age: 83
End: 2018-01-01

## 2018-01-01 ENCOUNTER — OFFICE VISIT (OUTPATIENT)
Dept: INTERNAL MEDICINE | Age: 83
End: 2018-01-01

## 2018-01-01 VITALS
DIASTOLIC BLOOD PRESSURE: 62 MMHG | HEIGHT: 63 IN | WEIGHT: 145 LBS | RESPIRATION RATE: 16 BRPM | SYSTOLIC BLOOD PRESSURE: 168 MMHG | OXYGEN SATURATION: 99 % | BODY MASS INDEX: 25.69 KG/M2 | TEMPERATURE: 98.2 F | HEART RATE: 63 BPM

## 2018-01-01 VITALS
BODY MASS INDEX: 28.25 KG/M2 | SYSTOLIC BLOOD PRESSURE: 143 MMHG | DIASTOLIC BLOOD PRESSURE: 42 MMHG | HEART RATE: 67 BPM | OXYGEN SATURATION: 99 % | HEIGHT: 62 IN | TEMPERATURE: 97.5 F | WEIGHT: 153.5 LBS | RESPIRATION RATE: 20 BRPM

## 2018-01-01 VITALS
DIASTOLIC BLOOD PRESSURE: 60 MMHG | BODY MASS INDEX: 30.07 KG/M2 | RESPIRATION RATE: 16 BRPM | HEART RATE: 60 BPM | OXYGEN SATURATION: 98 % | WEIGHT: 163.4 LBS | TEMPERATURE: 97.9 F | HEIGHT: 62 IN | SYSTOLIC BLOOD PRESSURE: 136 MMHG

## 2018-01-01 VITALS
OXYGEN SATURATION: 97 % | WEIGHT: 143 LBS | SYSTOLIC BLOOD PRESSURE: 119 MMHG | BODY MASS INDEX: 25.74 KG/M2 | HEART RATE: 56 BPM | DIASTOLIC BLOOD PRESSURE: 43 MMHG | RESPIRATION RATE: 16 BRPM

## 2018-01-01 VITALS
BODY MASS INDEX: 29.45 KG/M2 | DIASTOLIC BLOOD PRESSURE: 43 MMHG | SYSTOLIC BLOOD PRESSURE: 109 MMHG | WEIGHT: 160.05 LBS | RESPIRATION RATE: 16 BRPM | HEIGHT: 62 IN | OXYGEN SATURATION: 100 % | TEMPERATURE: 97.2 F | HEART RATE: 61 BPM

## 2018-01-01 VITALS
DIASTOLIC BLOOD PRESSURE: 60 MMHG | SYSTOLIC BLOOD PRESSURE: 137 MMHG | HEIGHT: 62 IN | TEMPERATURE: 97 F | OXYGEN SATURATION: 95 % | RESPIRATION RATE: 16 BRPM | BODY MASS INDEX: 28.56 KG/M2 | HEART RATE: 50 BPM | WEIGHT: 155.2 LBS

## 2018-01-01 VITALS
SYSTOLIC BLOOD PRESSURE: 110 MMHG | DIASTOLIC BLOOD PRESSURE: 60 MMHG | OXYGEN SATURATION: 98 % | RESPIRATION RATE: 16 BRPM | TEMPERATURE: 97.7 F | WEIGHT: 154.4 LBS | HEART RATE: 62 BPM | HEIGHT: 63 IN | BODY MASS INDEX: 27.36 KG/M2

## 2018-01-01 VITALS
WEIGHT: 157 LBS | SYSTOLIC BLOOD PRESSURE: 136 MMHG | HEART RATE: 62 BPM | RESPIRATION RATE: 14 BRPM | HEIGHT: 62 IN | OXYGEN SATURATION: 97 % | DIASTOLIC BLOOD PRESSURE: 60 MMHG | TEMPERATURE: 97.9 F | BODY MASS INDEX: 28.89 KG/M2

## 2018-01-01 VITALS
DIASTOLIC BLOOD PRESSURE: 60 MMHG | SYSTOLIC BLOOD PRESSURE: 119 MMHG | RESPIRATION RATE: 16 BRPM | HEART RATE: 61 BPM | OXYGEN SATURATION: 95 %

## 2018-01-01 VITALS
HEART RATE: 70 BPM | SYSTOLIC BLOOD PRESSURE: 122 MMHG | DIASTOLIC BLOOD PRESSURE: 60 MMHG | WEIGHT: 167.4 LBS | HEIGHT: 62 IN | OXYGEN SATURATION: 96 % | BODY MASS INDEX: 30.8 KG/M2 | TEMPERATURE: 98.5 F

## 2018-01-01 VITALS — SYSTOLIC BLOOD PRESSURE: 122 MMHG | DIASTOLIC BLOOD PRESSURE: 50 MMHG | RESPIRATION RATE: 16 BRPM | HEART RATE: 68 BPM

## 2018-01-01 VITALS — DIASTOLIC BLOOD PRESSURE: 42 MMHG | SYSTOLIC BLOOD PRESSURE: 128 MMHG | HEART RATE: 62 BPM | RESPIRATION RATE: 16 BRPM

## 2018-01-01 VITALS
SYSTOLIC BLOOD PRESSURE: 111 MMHG | TEMPERATURE: 98.3 F | BODY MASS INDEX: 26.89 KG/M2 | DIASTOLIC BLOOD PRESSURE: 49 MMHG | RESPIRATION RATE: 31 BRPM | WEIGHT: 147 LBS | OXYGEN SATURATION: 96 % | HEART RATE: 60 BPM

## 2018-01-01 VITALS
DIASTOLIC BLOOD PRESSURE: 56 MMHG | RESPIRATION RATE: 16 BRPM | HEART RATE: 66 BPM | BODY MASS INDEX: 26.1 KG/M2 | SYSTOLIC BLOOD PRESSURE: 129 MMHG | WEIGHT: 145 LBS

## 2018-01-01 DIAGNOSIS — K92.2 GASTROINTESTINAL HEMORRHAGE, UNSPECIFIED GASTROINTESTINAL HEMORRHAGE TYPE: Primary | ICD-10-CM

## 2018-01-01 DIAGNOSIS — R54 AGE-RELATED PHYSICAL DEBILITY: ICD-10-CM

## 2018-01-01 DIAGNOSIS — E11.65 TYPE 2 DIABETES MELLITUS WITH HYPERGLYCEMIA, WITH LONG-TERM CURRENT USE OF INSULIN (HCC): Primary | ICD-10-CM

## 2018-01-01 DIAGNOSIS — R60.0 EDEMA EXTREMITIES: Primary | ICD-10-CM

## 2018-01-01 DIAGNOSIS — C23 CANCER OF GALLBLADDER (HCC): ICD-10-CM

## 2018-01-01 DIAGNOSIS — N18.9 CHRONIC RENAL IMPAIRMENT, UNSPECIFIED CKD STAGE: ICD-10-CM

## 2018-01-01 DIAGNOSIS — L98.491 ULCER OF ABDOMEN WALL, LIMITED TO BREAKDOWN OF SKIN (HCC): ICD-10-CM

## 2018-01-01 DIAGNOSIS — I10 ESSENTIAL HYPERTENSION: ICD-10-CM

## 2018-01-01 DIAGNOSIS — I48.20 CHRONIC ATRIAL FIBRILLATION (HCC): ICD-10-CM

## 2018-01-01 DIAGNOSIS — Z79.4 TYPE 2 DIABETES MELLITUS WITH STAGE 4 CHRONIC KIDNEY DISEASE, WITH LONG-TERM CURRENT USE OF INSULIN (HCC): Primary | ICD-10-CM

## 2018-01-01 DIAGNOSIS — Z51.5 PALLIATIVE CARE PATIENT: ICD-10-CM

## 2018-01-01 DIAGNOSIS — K92.2 GASTROINTESTINAL HEMORRHAGE, UNSPECIFIED GASTROINTESTINAL HEMORRHAGE TYPE: ICD-10-CM

## 2018-01-01 DIAGNOSIS — I50.9 ACUTE ON CHRONIC CONGESTIVE HEART FAILURE, UNSPECIFIED CONGESTIVE HEART FAILURE TYPE: ICD-10-CM

## 2018-01-01 DIAGNOSIS — Z51.5 PALLIATIVE CARE ENCOUNTER: ICD-10-CM

## 2018-01-01 DIAGNOSIS — K94.01 BLEEDING FROM COLOSTOMY (HCC): Primary | ICD-10-CM

## 2018-01-01 DIAGNOSIS — N18.4 TYPE 2 DIABETES MELLITUS WITH STAGE 4 CHRONIC KIDNEY DISEASE, WITH LONG-TERM CURRENT USE OF INSULIN (HCC): Primary | ICD-10-CM

## 2018-01-01 DIAGNOSIS — R63.4 WEIGHT LOSS: ICD-10-CM

## 2018-01-01 DIAGNOSIS — E11.22 TYPE 2 DIABETES MELLITUS WITH STAGE 4 CHRONIC KIDNEY DISEASE, WITH LONG-TERM CURRENT USE OF INSULIN (HCC): ICD-10-CM

## 2018-01-01 DIAGNOSIS — C20 RECTAL CANCER (HCC): ICD-10-CM

## 2018-01-01 DIAGNOSIS — R53.83 FATIGUE, UNSPECIFIED TYPE: ICD-10-CM

## 2018-01-01 DIAGNOSIS — M79.89 LEG SWELLING: ICD-10-CM

## 2018-01-01 DIAGNOSIS — E11.22 TYPE 2 DIABETES MELLITUS WITH STAGE 4 CHRONIC KIDNEY DISEASE, WITH LONG-TERM CURRENT USE OF INSULIN (HCC): Primary | ICD-10-CM

## 2018-01-01 DIAGNOSIS — G89.29 OTHER CHRONIC PAIN: ICD-10-CM

## 2018-01-01 DIAGNOSIS — K92.2 CHRONIC LOWER GI BLEEDING: Primary | ICD-10-CM

## 2018-01-01 DIAGNOSIS — K25.4 CHRONIC GASTRIC ULCER WITH HEMORRHAGE: Primary | ICD-10-CM

## 2018-01-01 DIAGNOSIS — R73.9 HYPERGLYCEMIA: ICD-10-CM

## 2018-01-01 DIAGNOSIS — D64.9 ANEMIA, UNSPECIFIED TYPE: ICD-10-CM

## 2018-01-01 DIAGNOSIS — R60.0 EDEMA EXTREMITIES: ICD-10-CM

## 2018-01-01 DIAGNOSIS — Z79.4 TYPE 2 DIABETES MELLITUS WITH STAGE 4 CHRONIC KIDNEY DISEASE, WITH LONG-TERM CURRENT USE OF INSULIN (HCC): ICD-10-CM

## 2018-01-01 DIAGNOSIS — R53.1 GENERALIZED WEAKNESS: ICD-10-CM

## 2018-01-01 DIAGNOSIS — R42 LIGHTHEADEDNESS: Primary | ICD-10-CM

## 2018-01-01 DIAGNOSIS — R06.02 SOB (SHORTNESS OF BREATH): ICD-10-CM

## 2018-01-01 DIAGNOSIS — R79.89 ELEVATED LIVER FUNCTION TESTS: ICD-10-CM

## 2018-01-01 DIAGNOSIS — K94.01 BLEEDING FROM COLOSTOMY STOMA (HCC): Primary | ICD-10-CM

## 2018-01-01 DIAGNOSIS — Z79.4 TYPE 2 DIABETES MELLITUS WITH HYPERGLYCEMIA, WITH LONG-TERM CURRENT USE OF INSULIN (HCC): Primary | ICD-10-CM

## 2018-01-01 DIAGNOSIS — N18.4 TYPE 2 DIABETES MELLITUS WITH STAGE 4 CHRONIC KIDNEY DISEASE, WITH LONG-TERM CURRENT USE OF INSULIN (HCC): ICD-10-CM

## 2018-01-01 DIAGNOSIS — E87.5 HYPERKALEMIA: ICD-10-CM

## 2018-01-01 DIAGNOSIS — I95.9 HYPOTENSION, UNSPECIFIED HYPOTENSION TYPE: Primary | ICD-10-CM

## 2018-01-01 DIAGNOSIS — R06.02 SOB (SHORTNESS OF BREATH): Primary | ICD-10-CM

## 2018-01-01 LAB
ABO/RH: NORMAL
ALBUMIN SERPL-MCNC: 2.9 G/DL (ref 3.9–4.9)
ALBUMIN SERPL-MCNC: 2.9 G/DL (ref 3.9–4.9)
ALBUMIN SERPL-MCNC: 3.2 G/DL (ref 3.9–4.9)
ALP BLD-CCNC: 150 U/L (ref 40–130)
ALP BLD-CCNC: 151 U/L (ref 40–130)
ALP BLD-CCNC: 163 U/L (ref 40–130)
ALP BLD-CCNC: 184 U/L (ref 40–130)
ALP BLD-CCNC: 184 U/L (ref 40–130)
ALT SERPL-CCNC: 17 U/L (ref 0–33)
ALT SERPL-CCNC: 19 U/L (ref 0–33)
ANION GAP SERPL CALCULATED.3IONS-SCNC: 11 MEQ/L (ref 7–13)
ANION GAP SERPL CALCULATED.3IONS-SCNC: 11 MEQ/L (ref 7–13)
ANION GAP SERPL CALCULATED.3IONS-SCNC: 13 MEQ/L (ref 7–13)
ANION GAP SERPL CALCULATED.3IONS-SCNC: 13 MEQ/L (ref 7–13)
ANION GAP SERPL CALCULATED.3IONS-SCNC: 14 MEQ/L (ref 7–13)
ANION GAP SERPL CALCULATED.3IONS-SCNC: 14 MEQ/L (ref 7–13)
ANION GAP SERPL CALCULATED.3IONS-SCNC: 15 MEQ/L (ref 7–13)
ANION GAP SERPL CALCULATED.3IONS-SCNC: 16 MEQ/L (ref 7–13)
ANION GAP SERPL CALCULATED.3IONS-SCNC: 9 MEQ/L (ref 7–13)
ANION GAP SERPL CALCULATED.3IONS-SCNC: 9 MEQ/L (ref 7–13)
ANTIBODY SCREEN: NORMAL
APTT: 25.9 SEC (ref 21.6–35.4)
AST SERPL-CCNC: 31 U/L (ref 0–35)
AST SERPL-CCNC: 35 U/L (ref 0–35)
AST SERPL-CCNC: 42 U/L (ref 0–35)
AST SERPL-CCNC: 45 U/L (ref 0–35)
AST SERPL-CCNC: 61 U/L (ref 0–35)
BASOPHILS ABSOLUTE: 0 K/UL (ref 0–0.2)
BASOPHILS RELATIVE PERCENT: 0.3 %
BASOPHILS RELATIVE PERCENT: 0.3 %
BASOPHILS RELATIVE PERCENT: 0.5 %
BASOPHILS RELATIVE PERCENT: 0.6 %
BASOPHILS RELATIVE PERCENT: 0.7 %
BILIRUB SERPL-MCNC: 0.5 MG/DL (ref 0–1.2)
BILIRUB SERPL-MCNC: 0.7 MG/DL (ref 0–1.2)
BILIRUB SERPL-MCNC: 0.8 MG/DL (ref 0–1.2)
BLOOD BANK DISPENSE STATUS: NORMAL
BLOOD BANK DISPENSE STATUS: NORMAL
BLOOD BANK PRODUCT CODE: NORMAL
BLOOD BANK PRODUCT CODE: NORMAL
BLOOD CULTURE, ROUTINE: NORMAL
BPU ID: NORMAL
BPU ID: NORMAL
BUN BLDV-MCNC: 19 MG/DL (ref 8–23)
BUN BLDV-MCNC: 20 MG/DL (ref 8–23)
BUN BLDV-MCNC: 20 MG/DL (ref 8–23)
BUN BLDV-MCNC: 23 MG/DL (ref 8–23)
BUN BLDV-MCNC: 29 MG/DL (ref 8–23)
BUN BLDV-MCNC: 30 MG/DL (ref 8–23)
BUN BLDV-MCNC: 31 MG/DL (ref 8–23)
BUN BLDV-MCNC: 33 MG/DL (ref 8–23)
BUN BLDV-MCNC: 34 MG/DL (ref 8–23)
BUN BLDV-MCNC: 37 MG/DL (ref 8–23)
BUN BLDV-MCNC: 38 MG/DL (ref 8–23)
BUN BLDV-MCNC: 40 MG/DL (ref 8–23)
CALCIUM SERPL-MCNC: 8.1 MG/DL (ref 8.6–10.2)
CALCIUM SERPL-MCNC: 8.2 MG/DL (ref 8.6–10.2)
CALCIUM SERPL-MCNC: 8.3 MG/DL (ref 8.6–10.2)
CALCIUM SERPL-MCNC: 8.4 MG/DL (ref 8.6–10.2)
CALCIUM SERPL-MCNC: 8.4 MG/DL (ref 8.6–10.2)
CALCIUM SERPL-MCNC: 8.6 MG/DL (ref 8.6–10.2)
CALCIUM SERPL-MCNC: 8.9 MG/DL (ref 8.6–10.2)
CALCIUM SERPL-MCNC: 9.1 MG/DL (ref 8.6–10.2)
CALCIUM SERPL-MCNC: 9.2 MG/DL (ref 8.6–10.2)
CHLORIDE BLD-SCNC: 101 MEQ/L (ref 98–107)
CHLORIDE BLD-SCNC: 101 MEQ/L (ref 98–107)
CHLORIDE BLD-SCNC: 102 MEQ/L (ref 98–107)
CHLORIDE BLD-SCNC: 91 MEQ/L (ref 98–107)
CHLORIDE BLD-SCNC: 92 MEQ/L (ref 98–107)
CHLORIDE BLD-SCNC: 94 MEQ/L (ref 98–107)
CHLORIDE BLD-SCNC: 95 MEQ/L (ref 98–107)
CHLORIDE BLD-SCNC: 95 MEQ/L (ref 98–107)
CHLORIDE BLD-SCNC: 96 MEQ/L (ref 98–107)
CHLORIDE BLD-SCNC: 97 MEQ/L (ref 98–107)
CK MB: 1.3 NG/ML (ref 0–3.8)
CO2: 21 MEQ/L (ref 22–29)
CO2: 22 MEQ/L (ref 22–29)
CO2: 23 MEQ/L (ref 22–29)
CO2: 23 MEQ/L (ref 22–29)
CO2: 24 MEQ/L (ref 22–29)
CO2: 27 MEQ/L (ref 22–29)
CO2: 27 MEQ/L (ref 22–29)
CO2: 29 MEQ/L (ref 22–29)
CO2: 29 MEQ/L (ref 22–29)
CO2: 30 MEQ/L (ref 22–29)
CO2: 30 MEQ/L (ref 22–29)
CO2: 31 MEQ/L (ref 22–29)
CREAT SERPL-MCNC: 1.13 MG/DL (ref 0.5–0.9)
CREAT SERPL-MCNC: 1.13 MG/DL (ref 0.5–0.9)
CREAT SERPL-MCNC: 1.24 MG/DL (ref 0.5–0.9)
CREAT SERPL-MCNC: 1.31 MG/DL (ref 0.5–0.9)
CREAT SERPL-MCNC: 1.42 MG/DL (ref 0.5–0.9)
CREAT SERPL-MCNC: 1.5 MG/DL (ref 0.5–0.9)
CREAT SERPL-MCNC: 1.53 MG/DL (ref 0.5–0.9)
CREAT SERPL-MCNC: 1.54 MG/DL (ref 0.5–0.9)
CREAT SERPL-MCNC: 1.59 MG/DL (ref 0.5–0.9)
CREAT SERPL-MCNC: 1.6 MG/DL (ref 0.5–0.9)
CREAT SERPL-MCNC: 1.63 MG/DL (ref 0.5–0.9)
CREAT SERPL-MCNC: 1.87 MG/DL (ref 0.5–0.9)
CREATINE KINASE-MB INDEX: 4.2 % (ref 0–3.5)
CULTURE, BLOOD 2: NORMAL
DESCRIPTION BLOOD BANK: NORMAL
DESCRIPTION BLOOD BANK: NORMAL
EKG ATRIAL RATE: 250 BPM
EKG ATRIAL RATE: 312 BPM
EKG ATRIAL RATE: 64 BPM
EKG Q-T INTERVAL: 452 MS
EKG Q-T INTERVAL: 464 MS
EKG Q-T INTERVAL: 500 MS
EKG QRS DURATION: 156 MS
EKG QRS DURATION: 156 MS
EKG QRS DURATION: 158 MS
EKG QTC CALCULATION (BAZETT): 466 MS
EKG QTC CALCULATION (BAZETT): 470 MS
EKG QTC CALCULATION (BAZETT): 500 MS
EKG R AXIS: 47 DEGREES
EKG R AXIS: 64 DEGREES
EKG R AXIS: 7 DEGREES
EKG T AXIS: 121 DEGREES
EKG T AXIS: 146 DEGREES
EKG T AXIS: 161 DEGREES
EKG VENTRICULAR RATE: 60 BPM
EKG VENTRICULAR RATE: 62 BPM
EKG VENTRICULAR RATE: 64 BPM
EOSINOPHILS ABSOLUTE: 0.1 K/UL (ref 0–0.7)
EOSINOPHILS ABSOLUTE: 0.2 K/UL (ref 0–0.7)
EOSINOPHILS RELATIVE PERCENT: 1.3 %
EOSINOPHILS RELATIVE PERCENT: 1.5 %
EOSINOPHILS RELATIVE PERCENT: 1.5 %
EOSINOPHILS RELATIVE PERCENT: 1.8 %
EOSINOPHILS RELATIVE PERCENT: 2 %
EOSINOPHILS RELATIVE PERCENT: 2.5 %
EOSINOPHILS RELATIVE PERCENT: 2.9 %
EOSINOPHILS RELATIVE PERCENT: 3 %
EOSINOPHILS RELATIVE PERCENT: 3.1 %
GFR AFRICAN AMERICAN: 30.7
GFR AFRICAN AMERICAN: 35.9
GFR AFRICAN AMERICAN: 36.7
GFR AFRICAN AMERICAN: 37
GFR AFRICAN AMERICAN: 38.4
GFR AFRICAN AMERICAN: 38.7
GFR AFRICAN AMERICAN: 39.6
GFR AFRICAN AMERICAN: 42.1
GFR AFRICAN AMERICAN: 46.2
GFR AFRICAN AMERICAN: 49.3
GFR AFRICAN AMERICAN: 54.8
GFR AFRICAN AMERICAN: 54.8
GFR NON-AFRICAN AMERICAN: 25.3
GFR NON-AFRICAN AMERICAN: 29.7
GFR NON-AFRICAN AMERICAN: 30.3
GFR NON-AFRICAN AMERICAN: 30.6
GFR NON-AFRICAN AMERICAN: 31.7
GFR NON-AFRICAN AMERICAN: 31.9
GFR NON-AFRICAN AMERICAN: 32.7
GFR NON-AFRICAN AMERICAN: 34.8
GFR NON-AFRICAN AMERICAN: 38.2
GFR NON-AFRICAN AMERICAN: 40.7
GFR NON-AFRICAN AMERICAN: 45.3
GFR NON-AFRICAN AMERICAN: 45.3
GLOBULIN: 3.1 G/DL (ref 2.3–3.5)
GLOBULIN: 3.2 G/DL (ref 2.3–3.5)
GLOBULIN: 3.3 G/DL (ref 2.3–3.5)
GLOBULIN: 3.4 G/DL (ref 2.3–3.5)
GLOBULIN: 3.6 G/DL (ref 2.3–3.5)
GLUCOSE BLD-MCNC: 101 MG/DL (ref 60–115)
GLUCOSE BLD-MCNC: 116 MG/DL (ref 60–115)
GLUCOSE BLD-MCNC: 118 MG/DL (ref 60–115)
GLUCOSE BLD-MCNC: 118 MG/DL (ref 60–115)
GLUCOSE BLD-MCNC: 120 MG/DL (ref 60–115)
GLUCOSE BLD-MCNC: 121 MG/DL (ref 74–109)
GLUCOSE BLD-MCNC: 122 MG/DL (ref 60–115)
GLUCOSE BLD-MCNC: 126 MG/DL (ref 74–109)
GLUCOSE BLD-MCNC: 127 MG/DL (ref 60–115)
GLUCOSE BLD-MCNC: 131 MG/DL (ref 60–115)
GLUCOSE BLD-MCNC: 134 MG/DL (ref 60–115)
GLUCOSE BLD-MCNC: 135 MG/DL (ref 60–115)
GLUCOSE BLD-MCNC: 138 MG/DL (ref 60–115)
GLUCOSE BLD-MCNC: 141 MG/DL (ref 60–115)
GLUCOSE BLD-MCNC: 142 MG/DL (ref 60–115)
GLUCOSE BLD-MCNC: 142 MG/DL (ref 60–115)
GLUCOSE BLD-MCNC: 143 MG/DL (ref 60–115)
GLUCOSE BLD-MCNC: 150 MG/DL (ref 60–115)
GLUCOSE BLD-MCNC: 156 MG/DL (ref 60–115)
GLUCOSE BLD-MCNC: 158 MG/DL (ref 60–115)
GLUCOSE BLD-MCNC: 160 MG/DL (ref 60–115)
GLUCOSE BLD-MCNC: 163 MG/DL (ref 60–115)
GLUCOSE BLD-MCNC: 164 MG/DL (ref 60–115)
GLUCOSE BLD-MCNC: 165 MG/DL (ref 60–115)
GLUCOSE BLD-MCNC: 165 MG/DL (ref 74–109)
GLUCOSE BLD-MCNC: 166 MG/DL (ref 60–115)
GLUCOSE BLD-MCNC: 178 MG/DL (ref 60–115)
GLUCOSE BLD-MCNC: 178 MG/DL (ref 74–109)
GLUCOSE BLD-MCNC: 179 MG/DL (ref 74–109)
GLUCOSE BLD-MCNC: 180 MG/DL (ref 60–115)
GLUCOSE BLD-MCNC: 181 MG/DL (ref 60–115)
GLUCOSE BLD-MCNC: 182 MG/DL (ref 74–109)
GLUCOSE BLD-MCNC: 183 MG/DL (ref 60–115)
GLUCOSE BLD-MCNC: 184 MG/DL (ref 60–115)
GLUCOSE BLD-MCNC: 188 MG/DL (ref 60–115)
GLUCOSE BLD-MCNC: 189 MG/DL (ref 60–115)
GLUCOSE BLD-MCNC: 190 MG/DL (ref 60–115)
GLUCOSE BLD-MCNC: 190 MG/DL (ref 74–109)
GLUCOSE BLD-MCNC: 192 MG/DL (ref 60–115)
GLUCOSE BLD-MCNC: 192 MG/DL (ref 60–115)
GLUCOSE BLD-MCNC: 198 MG/DL (ref 60–115)
GLUCOSE BLD-MCNC: 210 MG/DL (ref 60–115)
GLUCOSE BLD-MCNC: 212 MG/DL (ref 60–115)
GLUCOSE BLD-MCNC: 218 MG/DL (ref 60–115)
GLUCOSE BLD-MCNC: 222 MG/DL (ref 60–115)
GLUCOSE BLD-MCNC: 226 MG/DL (ref 74–109)
GLUCOSE BLD-MCNC: 230 MG/DL (ref 60–115)
GLUCOSE BLD-MCNC: 235 MG/DL (ref 60–115)
GLUCOSE BLD-MCNC: 237 MG/DL (ref 60–115)
GLUCOSE BLD-MCNC: 240 MG/DL (ref 60–115)
GLUCOSE BLD-MCNC: 241 MG/DL (ref 60–115)
GLUCOSE BLD-MCNC: 242 MG/DL (ref 60–115)
GLUCOSE BLD-MCNC: 242 MG/DL (ref 60–115)
GLUCOSE BLD-MCNC: 243 MG/DL (ref 60–115)
GLUCOSE BLD-MCNC: 246 MG/DL (ref 60–115)
GLUCOSE BLD-MCNC: 246 MG/DL (ref 60–115)
GLUCOSE BLD-MCNC: 247 MG/DL (ref 60–115)
GLUCOSE BLD-MCNC: 247 MG/DL (ref 60–115)
GLUCOSE BLD-MCNC: 248 MG/DL (ref 60–115)
GLUCOSE BLD-MCNC: 251 MG/DL (ref 60–115)
GLUCOSE BLD-MCNC: 252 MG/DL (ref 60–115)
GLUCOSE BLD-MCNC: 254 MG/DL (ref 60–115)
GLUCOSE BLD-MCNC: 256 MG/DL (ref 60–115)
GLUCOSE BLD-MCNC: 257 MG/DL (ref 60–115)
GLUCOSE BLD-MCNC: 260 MG/DL (ref 60–115)
GLUCOSE BLD-MCNC: 270 MG/DL (ref 60–115)
GLUCOSE BLD-MCNC: 272 MG/DL (ref 60–115)
GLUCOSE BLD-MCNC: 274 MG/DL (ref 60–115)
GLUCOSE BLD-MCNC: 279 MG/DL (ref 74–109)
GLUCOSE BLD-MCNC: 287 MG/DL (ref 60–115)
GLUCOSE BLD-MCNC: 290 MG/DL (ref 60–115)
GLUCOSE BLD-MCNC: 296 MG/DL (ref 60–115)
GLUCOSE BLD-MCNC: 297 MG/DL (ref 60–115)
GLUCOSE BLD-MCNC: 305 MG/DL (ref 60–115)
GLUCOSE BLD-MCNC: 318 MG/DL (ref 60–115)
GLUCOSE BLD-MCNC: 333 MG/DL (ref 60–115)
GLUCOSE BLD-MCNC: 364 MG/DL (ref 74–109)
GLUCOSE BLD-MCNC: 68 MG/DL (ref 60–115)
GLUCOSE BLD-MCNC: 78 MG/DL (ref 60–115)
GLUCOSE BLD-MCNC: 81 MG/DL (ref 60–115)
GLUCOSE BLD-MCNC: 87 MG/DL (ref 74–109)
GLUCOSE BLD-MCNC: 93 MG/DL (ref 60–115)
GLUCOSE BLD-MCNC: 98 MG/DL (ref 60–115)
HCT VFR BLD CALC: 24.9 % (ref 37–47)
HCT VFR BLD CALC: 25 % (ref 37–47)
HCT VFR BLD CALC: 27.8 % (ref 37–47)
HCT VFR BLD CALC: 28 % (ref 37–47)
HCT VFR BLD CALC: 28 % (ref 37–47)
HCT VFR BLD CALC: 28.1 % (ref 37–47)
HCT VFR BLD CALC: 28.2 % (ref 37–47)
HCT VFR BLD CALC: 28.4 % (ref 37–47)
HCT VFR BLD CALC: 28.6 % (ref 37–47)
HCT VFR BLD CALC: 29.2 % (ref 37–47)
HCT VFR BLD CALC: 29.4 % (ref 37–47)
HCT VFR BLD CALC: 29.6 % (ref 37–47)
HCT VFR BLD CALC: 30.4 % (ref 37–47)
HCT VFR BLD CALC: 30.6 % (ref 37–47)
HCT VFR BLD CALC: 30.7 % (ref 37–47)
HCT VFR BLD CALC: 30.8 % (ref 37–47)
HCT VFR BLD CALC: 31.5 % (ref 37–47)
HCT VFR BLD CALC: 31.5 % (ref 37–47)
HCT VFR BLD CALC: 31.6 % (ref 37–47)
HCT VFR BLD CALC: 31.7 % (ref 37–47)
HCT VFR BLD CALC: 31.9 % (ref 37–47)
HCT VFR BLD CALC: 32.1 % (ref 37–47)
HCT VFR BLD CALC: 32.9 % (ref 37–47)
HEMATOLOGY PATH CONSULT: NO
HEMOGLOBIN: 10 G/DL (ref 12–16)
HEMOGLOBIN: 10.2 G/DL (ref 12–16)
HEMOGLOBIN: 10.3 G/DL (ref 12–16)
HEMOGLOBIN: 10.3 G/DL (ref 12–16)
HEMOGLOBIN: 10.4 G/DL (ref 12–16)
HEMOGLOBIN: 10.5 G/DL (ref 12–16)
HEMOGLOBIN: 10.6 G/DL (ref 12–16)
HEMOGLOBIN: 10.8 G/DL (ref 12–16)
HEMOGLOBIN: 10.8 G/DL (ref 12–16)
HEMOGLOBIN: 11.1 G/DL (ref 12–16)
HEMOGLOBIN: 8.2 G/DL (ref 12–16)
HEMOGLOBIN: 8.3 G/DL (ref 12–16)
HEMOGLOBIN: 9 G/DL (ref 12–16)
HEMOGLOBIN: 9.2 G/DL (ref 12–16)
HEMOGLOBIN: 9.2 G/DL (ref 12–16)
HEMOGLOBIN: 9.3 G/DL (ref 12–16)
HEMOGLOBIN: 9.4 G/DL (ref 12–16)
HEMOGLOBIN: 9.6 G/DL (ref 12–16)
HEMOGLOBIN: 9.7 G/DL (ref 12–16)
HEMOGLOBIN: 9.7 G/DL (ref 12–16)
HEMOGLOBIN: 9.8 G/DL (ref 12–16)
HEMOGLOBIN: 9.8 G/DL (ref 12–16)
HEMOGLOBIN: 9.9 G/DL (ref 12–16)
HYPOCHROMIA: ABNORMAL
INR BLD: 1.1
INR BLD: 1.1
INR BLD: 1.2
INR BLD: 1.2
LYMPHOCYTES ABSOLUTE: 0.9 K/UL (ref 1–4.8)
LYMPHOCYTES ABSOLUTE: 1 K/UL (ref 1–4.8)
LYMPHOCYTES ABSOLUTE: 1 K/UL (ref 1–4.8)
LYMPHOCYTES ABSOLUTE: 1.1 K/UL (ref 1–4.8)
LYMPHOCYTES ABSOLUTE: 1.1 K/UL (ref 1–4.8)
LYMPHOCYTES ABSOLUTE: 1.2 K/UL (ref 1–4.8)
LYMPHOCYTES ABSOLUTE: 1.7 K/UL (ref 1–4.8)
LYMPHOCYTES RELATIVE PERCENT: 14.5 %
LYMPHOCYTES RELATIVE PERCENT: 14.6 %
LYMPHOCYTES RELATIVE PERCENT: 15 %
LYMPHOCYTES RELATIVE PERCENT: 15.8 %
LYMPHOCYTES RELATIVE PERCENT: 16.2 %
LYMPHOCYTES RELATIVE PERCENT: 17.8 %
LYMPHOCYTES RELATIVE PERCENT: 20.4 %
LYMPHOCYTES RELATIVE PERCENT: 20.9 %
LYMPHOCYTES RELATIVE PERCENT: 29.5 %
MAGNESIUM: 1.9 MG/DL (ref 1.7–2.3)
MAGNESIUM: 2.1 MG/DL (ref 1.7–2.3)
MCH RBC QN AUTO: 30.1 PG (ref 27–31.3)
MCH RBC QN AUTO: 30.5 PG (ref 27–31.3)
MCH RBC QN AUTO: 30.5 PG (ref 27–31.3)
MCH RBC QN AUTO: 30.7 PG (ref 27–31.3)
MCH RBC QN AUTO: 30.8 PG (ref 27–31.3)
MCH RBC QN AUTO: 31.1 PG (ref 27–31.3)
MCH RBC QN AUTO: 31.2 PG (ref 27–31.3)
MCH RBC QN AUTO: 31.2 PG (ref 27–31.3)
MCH RBC QN AUTO: 31.5 PG (ref 27–31.3)
MCH RBC QN AUTO: 31.5 PG (ref 27–31.3)
MCH RBC QN AUTO: 31.6 PG (ref 27–31.3)
MCH RBC QN AUTO: 31.6 PG (ref 27–31.3)
MCH RBC QN AUTO: 31.9 PG (ref 27–31.3)
MCHC RBC AUTO-ENTMCNC: 32.5 % (ref 33–37)
MCHC RBC AUTO-ENTMCNC: 33.1 % (ref 33–37)
MCHC RBC AUTO-ENTMCNC: 33.3 % (ref 33–37)
MCHC RBC AUTO-ENTMCNC: 33.4 % (ref 33–37)
MCHC RBC AUTO-ENTMCNC: 33.5 % (ref 33–37)
MCHC RBC AUTO-ENTMCNC: 33.8 % (ref 33–37)
MCHC RBC AUTO-ENTMCNC: 33.8 % (ref 33–37)
MCHC RBC AUTO-ENTMCNC: 33.9 % (ref 33–37)
MCHC RBC AUTO-ENTMCNC: 33.9 % (ref 33–37)
MCHC RBC AUTO-ENTMCNC: 34 % (ref 33–37)
MCHC RBC AUTO-ENTMCNC: 34.3 % (ref 33–37)
MCV RBC AUTO: 91.7 FL (ref 82–100)
MCV RBC AUTO: 91.9 FL (ref 82–100)
MCV RBC AUTO: 92.2 FL (ref 82–100)
MCV RBC AUTO: 92.5 FL (ref 82–100)
MCV RBC AUTO: 92.7 FL (ref 82–100)
MCV RBC AUTO: 92.8 FL (ref 82–100)
MCV RBC AUTO: 92.9 FL (ref 82–100)
MCV RBC AUTO: 93.1 FL (ref 82–100)
MCV RBC AUTO: 93.2 FL (ref 82–100)
MONOCYTES ABSOLUTE: 0.5 K/UL (ref 0.2–0.8)
MONOCYTES ABSOLUTE: 0.6 K/UL (ref 0.2–0.8)
MONOCYTES ABSOLUTE: 0.6 K/UL (ref 0.2–0.8)
MONOCYTES ABSOLUTE: 0.7 K/UL (ref 0.2–0.8)
MONOCYTES ABSOLUTE: 0.8 K/UL (ref 0.2–0.8)
MONOCYTES ABSOLUTE: 0.9 K/UL (ref 0.2–0.8)
MONOCYTES RELATIVE PERCENT: 10.2 %
MONOCYTES RELATIVE PERCENT: 10.8 %
MONOCYTES RELATIVE PERCENT: 11.2 %
MONOCYTES RELATIVE PERCENT: 11.2 %
MONOCYTES RELATIVE PERCENT: 11.7 %
MONOCYTES RELATIVE PERCENT: 12.5 %
MONOCYTES RELATIVE PERCENT: 13.4 %
MONOCYTES RELATIVE PERCENT: 14 %
MONOCYTES RELATIVE PERCENT: 8.3 %
NEUTROPHILS ABSOLUTE: 3.1 K/UL (ref 1.4–6.5)
NEUTROPHILS ABSOLUTE: 3.1 K/UL (ref 1.4–6.5)
NEUTROPHILS ABSOLUTE: 3.4 K/UL (ref 1.4–6.5)
NEUTROPHILS ABSOLUTE: 3.5 K/UL (ref 1.4–6.5)
NEUTROPHILS ABSOLUTE: 4.3 K/UL (ref 1.4–6.5)
NEUTROPHILS ABSOLUTE: 4.5 K/UL (ref 1.4–6.5)
NEUTROPHILS ABSOLUTE: 4.5 K/UL (ref 1.4–6.5)
NEUTROPHILS ABSOLUTE: 4.7 K/UL (ref 1.4–6.5)
NEUTROPHILS ABSOLUTE: 5.7 K/UL (ref 1.4–6.5)
NEUTROPHILS RELATIVE PERCENT: 54.5 %
NEUTROPHILS RELATIVE PERCENT: 62.4 %
NEUTROPHILS RELATIVE PERCENT: 66.5 %
NEUTROPHILS RELATIVE PERCENT: 67.8 %
NEUTROPHILS RELATIVE PERCENT: 69.2 %
NEUTROPHILS RELATIVE PERCENT: 70.6 %
NEUTROPHILS RELATIVE PERCENT: 71.1 %
NEUTROPHILS RELATIVE PERCENT: 71.4 %
NEUTROPHILS RELATIVE PERCENT: 74.1 %
PDW BLD-RTO: 14.7 % (ref 11.5–14.5)
PDW BLD-RTO: 14.8 % (ref 11.5–14.5)
PDW BLD-RTO: 14.8 % (ref 11.5–14.5)
PDW BLD-RTO: 14.9 % (ref 11.5–14.5)
PDW BLD-RTO: 14.9 % (ref 11.5–14.5)
PDW BLD-RTO: 15 % (ref 11.5–14.5)
PDW BLD-RTO: 15.1 % (ref 11.5–14.5)
PDW BLD-RTO: 15.3 % (ref 11.5–14.5)
PDW BLD-RTO: 15.4 % (ref 11.5–14.5)
PDW BLD-RTO: 15.4 % (ref 11.5–14.5)
PDW BLD-RTO: 15.5 % (ref 11.5–14.5)
PDW BLD-RTO: 15.5 % (ref 11.5–14.5)
PDW BLD-RTO: 15.6 % (ref 11.5–14.5)
PERFORMED ON: ABNORMAL
PERFORMED ON: NORMAL
PLATELET # BLD: 100 K/UL (ref 130–400)
PLATELET # BLD: 100 K/UL (ref 130–400)
PLATELET # BLD: 102 K/UL (ref 130–400)
PLATELET # BLD: 110 K/UL (ref 130–400)
PLATELET # BLD: 114 K/UL (ref 130–400)
PLATELET # BLD: 115 K/UL (ref 130–400)
PLATELET # BLD: 82 K/UL (ref 130–400)
PLATELET # BLD: 92 K/UL (ref 130–400)
PLATELET # BLD: 92 K/UL (ref 130–400)
PLATELET # BLD: 95 K/UL (ref 130–400)
PLATELET # BLD: 95 K/UL (ref 130–400)
PLATELET # BLD: 97 K/UL (ref 130–400)
PLATELET # BLD: 99 K/UL (ref 130–400)
PLATELET SLIDE REVIEW: ABNORMAL
POIKILOCYTES: ABNORMAL
POTASSIUM REFLEX MAGNESIUM: 3.7 MEQ/L (ref 3.5–5.1)
POTASSIUM SERPL-SCNC: 3.7 MEQ/L (ref 3.5–5.1)
POTASSIUM SERPL-SCNC: 3.9 MEQ/L (ref 3.5–5.1)
POTASSIUM SERPL-SCNC: 4 MEQ/L (ref 3.5–5.1)
POTASSIUM SERPL-SCNC: 4.1 MEQ/L (ref 3.5–5.1)
POTASSIUM SERPL-SCNC: 4.2 MEQ/L (ref 3.5–5.1)
POTASSIUM SERPL-SCNC: 4.3 MEQ/L (ref 3.5–5.1)
POTASSIUM SERPL-SCNC: 4.7 MEQ/L (ref 3.5–5.1)
POTASSIUM SERPL-SCNC: 4.7 MEQ/L (ref 3.5–5.1)
POTASSIUM SERPL-SCNC: 5.2 MEQ/L (ref 3.5–5.1)
PRO-BNP: 2995 PG/ML
PRO-BNP: 3577 PG/ML
PROTHROMBIN TIME: 11.4 SEC (ref 8.1–13.7)
PROTHROMBIN TIME: 11.4 SEC (ref 9.6–12.3)
PROTHROMBIN TIME: 12.2 SEC (ref 9.6–12.3)
PROTHROMBIN TIME: 12.6 SEC (ref 9.6–12.3)
RBC # BLD: 2.7 M/UL (ref 4.2–5.4)
RBC # BLD: 2.7 M/UL (ref 4.2–5.4)
RBC # BLD: 3 M/UL (ref 4.2–5.4)
RBC # BLD: 3.04 M/UL (ref 4.2–5.4)
RBC # BLD: 3.15 M/UL (ref 4.2–5.4)
RBC # BLD: 3.16 M/UL (ref 4.2–5.4)
RBC # BLD: 3.19 M/UL (ref 4.2–5.4)
RBC # BLD: 3.26 M/UL (ref 4.2–5.4)
RBC # BLD: 3.28 M/UL (ref 4.2–5.4)
RBC # BLD: 3.31 M/UL (ref 4.2–5.4)
RBC # BLD: 3.4 M/UL (ref 4.2–5.4)
RBC # BLD: 3.45 M/UL (ref 4.2–5.4)
RBC # BLD: 3.58 M/UL (ref 4.2–5.4)
SLIDE REVIEW: ABNORMAL
SODIUM BLD-SCNC: 133 MEQ/L (ref 132–144)
SODIUM BLD-SCNC: 133 MEQ/L (ref 132–144)
SODIUM BLD-SCNC: 134 MEQ/L (ref 132–144)
SODIUM BLD-SCNC: 134 MEQ/L (ref 132–144)
SODIUM BLD-SCNC: 135 MEQ/L (ref 132–144)
SODIUM BLD-SCNC: 135 MEQ/L (ref 132–144)
SODIUM BLD-SCNC: 136 MEQ/L (ref 132–144)
SODIUM BLD-SCNC: 137 MEQ/L (ref 132–144)
SODIUM BLD-SCNC: 138 MEQ/L (ref 132–144)
STOMATOCYTES: ABNORMAL
TOTAL CK: 31 U/L (ref 0–170)
TOTAL PROTEIN: 6.2 G/DL (ref 6.4–8.1)
TOTAL PROTEIN: 6.3 G/DL (ref 6.4–8.1)
TOTAL PROTEIN: 6.3 G/DL (ref 6.4–8.1)
TOTAL PROTEIN: 6.4 G/DL (ref 6.4–8.1)
TOTAL PROTEIN: 6.8 G/DL (ref 6.4–8.1)
TROPONIN: <0.01 NG/ML (ref 0–0.01)
TROPONIN: <0.01 NG/ML (ref 0–0.01)
URINE CULTURE, ROUTINE: NORMAL
WBC # BLD: 5 K/UL (ref 4.8–10.8)
WBC # BLD: 5.1 K/UL (ref 4.8–10.8)
WBC # BLD: 5.8 K/UL (ref 4.8–10.8)
WBC # BLD: 6 K/UL (ref 4.8–10.8)
WBC # BLD: 6 K/UL (ref 4.8–10.8)
WBC # BLD: 6.2 K/UL (ref 4.8–10.8)
WBC # BLD: 6.3 K/UL (ref 4.8–10.8)
WBC # BLD: 6.5 K/UL (ref 4.8–10.8)
WBC # BLD: 6.6 K/UL (ref 4.8–10.8)
WBC # BLD: 6.7 K/UL (ref 4.8–10.8)
WBC # BLD: 7.6 K/UL (ref 4.8–10.8)

## 2018-01-01 PROCEDURE — 97535 SELF CARE MNGMENT TRAINING: CPT | Performed by: INTERNAL MEDICINE

## 2018-01-01 PROCEDURE — 6370000000 HC RX 637 (ALT 250 FOR IP): Performed by: PHYSICAL MEDICINE & REHABILITATION

## 2018-01-01 PROCEDURE — 2580000003 HC RX 258: Performed by: INTERNAL MEDICINE

## 2018-01-01 PROCEDURE — 6370000000 HC RX 637 (ALT 250 FOR IP): Performed by: INTERNAL MEDICINE

## 2018-01-01 PROCEDURE — 85025 COMPLETE CBC W/AUTO DIFF WBC: CPT

## 2018-01-01 PROCEDURE — 74176 CT ABD & PELVIS W/O CONTRAST: CPT

## 2018-01-01 PROCEDURE — 1180000000 HC REHAB R&B

## 2018-01-01 PROCEDURE — 36415 COLL VENOUS BLD VENIPUNCTURE: CPT

## 2018-01-01 PROCEDURE — 85610 PROTHROMBIN TIME: CPT

## 2018-01-01 PROCEDURE — 2580000003 HC RX 258: Performed by: PHYSICIAN ASSISTANT

## 2018-01-01 PROCEDURE — 86900 BLOOD TYPING SEROLOGIC ABO: CPT

## 2018-01-01 PROCEDURE — 99348 HOME/RES VST EST LOW MDM 30: CPT | Performed by: NURSE PRACTITIONER

## 2018-01-01 PROCEDURE — 99232 SBSQ HOSP IP/OBS MODERATE 35: CPT | Performed by: PHYSICAL MEDICINE & REHABILITATION

## 2018-01-01 PROCEDURE — 6360000002 HC RX W HCPCS: Performed by: INTERNAL MEDICINE

## 2018-01-01 PROCEDURE — C9113 INJ PANTOPRAZOLE SODIUM, VIA: HCPCS | Performed by: INTERNAL MEDICINE

## 2018-01-01 PROCEDURE — 97535 SELF CARE MNGMENT TRAINING: CPT

## 2018-01-01 PROCEDURE — 97530 THERAPEUTIC ACTIVITIES: CPT

## 2018-01-01 PROCEDURE — 80053 COMPREHEN METABOLIC PANEL: CPT

## 2018-01-01 PROCEDURE — 1036F TOBACCO NON-USER: CPT | Performed by: FAMILY MEDICINE

## 2018-01-01 PROCEDURE — G8598 ASA/ANTIPLAT THER USED: HCPCS | Performed by: FAMILY MEDICINE

## 2018-01-01 PROCEDURE — 85018 HEMOGLOBIN: CPT

## 2018-01-01 PROCEDURE — 6370000000 HC RX 637 (ALT 250 FOR IP): Performed by: NURSE PRACTITIONER

## 2018-01-01 PROCEDURE — 1111F DSCHRG MED/CURRENT MED MERGE: CPT | Performed by: FAMILY MEDICINE

## 2018-01-01 PROCEDURE — 99285 EMERGENCY DEPT VISIT HI MDM: CPT

## 2018-01-01 PROCEDURE — G8979 MOBILITY GOAL STATUS: HCPCS

## 2018-01-01 PROCEDURE — 99231 SBSQ HOSP IP/OBS SF/LOW 25: CPT | Performed by: PHYSICAL MEDICINE & REHABILITATION

## 2018-01-01 PROCEDURE — 2700000000 HC OXYGEN THERAPY PER DAY

## 2018-01-01 PROCEDURE — 1210000000 HC MED SURG R&B

## 2018-01-01 PROCEDURE — 85027 COMPLETE CBC AUTOMATED: CPT

## 2018-01-01 PROCEDURE — 99495 TRANSJ CARE MGMT MOD F2F 14D: CPT | Performed by: FAMILY MEDICINE

## 2018-01-01 PROCEDURE — 85014 HEMATOCRIT: CPT

## 2018-01-01 PROCEDURE — 99238 HOSP IP/OBS DSCHRG MGMT 30/<: CPT | Performed by: PHYSICAL MEDICINE & REHABILITATION

## 2018-01-01 PROCEDURE — 99233 SBSQ HOSP IP/OBS HIGH 50: CPT | Performed by: PHYSICAL MEDICINE & REHABILITATION

## 2018-01-01 PROCEDURE — 93005 ELECTROCARDIOGRAM TRACING: CPT

## 2018-01-01 PROCEDURE — 97110 THERAPEUTIC EXERCISES: CPT | Performed by: INTERNAL MEDICINE

## 2018-01-01 PROCEDURE — 93010 ELECTROCARDIOGRAM REPORT: CPT | Performed by: INTERNAL MEDICINE

## 2018-01-01 PROCEDURE — 94640 AIRWAY INHALATION TREATMENT: CPT

## 2018-01-01 PROCEDURE — 80048 BASIC METABOLIC PNL TOTAL CA: CPT

## 2018-01-01 PROCEDURE — G0515 COGNITIVE SKILLS DEVELOPMENT: HCPCS

## 2018-01-01 PROCEDURE — 97116 GAIT TRAINING THERAPY: CPT | Performed by: INTERNAL MEDICINE

## 2018-01-01 PROCEDURE — 97165 OT EVAL LOW COMPLEX 30 MIN: CPT

## 2018-01-01 PROCEDURE — 97162 PT EVAL MOD COMPLEX 30 MIN: CPT

## 2018-01-01 PROCEDURE — 4040F PNEUMOC VAC/ADMIN/RCVD: CPT | Performed by: FAMILY MEDICINE

## 2018-01-01 PROCEDURE — 99231 SBSQ HOSP IP/OBS SF/LOW 25: CPT | Performed by: SURGERY

## 2018-01-01 PROCEDURE — 99282 EMERGENCY DEPT VISIT SF MDM: CPT

## 2018-01-01 PROCEDURE — 92523 SPEECH SOUND LANG COMPREHEN: CPT

## 2018-01-01 PROCEDURE — 96374 THER/PROPH/DIAG INJ IV PUSH: CPT

## 2018-01-01 PROCEDURE — G8988 SELF CARE GOAL STATUS: HCPCS

## 2018-01-01 PROCEDURE — 6360000002 HC RX W HCPCS: Performed by: PERSONAL EMERGENCY RESPONSE ATTENDANT

## 2018-01-01 PROCEDURE — G8978 MOBILITY CURRENT STATUS: HCPCS

## 2018-01-01 PROCEDURE — 97112 NEUROMUSCULAR REEDUCATION: CPT | Performed by: INTERNAL MEDICINE

## 2018-01-01 PROCEDURE — 86850 RBC ANTIBODY SCREEN: CPT

## 2018-01-01 PROCEDURE — G8419 CALC BMI OUT NRM PARAM NOF/U: HCPCS | Performed by: FAMILY MEDICINE

## 2018-01-01 PROCEDURE — 97110 THERAPEUTIC EXERCISES: CPT

## 2018-01-01 PROCEDURE — 97116 GAIT TRAINING THERAPY: CPT

## 2018-01-01 PROCEDURE — 1123F ACP DISCUSS/DSCN MKR DOCD: CPT | Performed by: FAMILY MEDICINE

## 2018-01-01 PROCEDURE — 97166 OT EVAL MOD COMPLEX 45 MIN: CPT

## 2018-01-01 PROCEDURE — 83880 ASSAY OF NATRIURETIC PEPTIDE: CPT

## 2018-01-01 PROCEDURE — 71045 X-RAY EXAM CHEST 1 VIEW: CPT

## 2018-01-01 PROCEDURE — 99211 OFF/OP EST MAY X REQ PHY/QHP: CPT

## 2018-01-01 PROCEDURE — 2580000003 HC RX 258: Performed by: NURSE PRACTITIONER

## 2018-01-01 PROCEDURE — 2580000003 HC RX 258: Performed by: EMERGENCY MEDICINE

## 2018-01-01 PROCEDURE — 99222 1ST HOSP IP/OBS MODERATE 55: CPT | Performed by: PHYSICAL MEDICINE & REHABILITATION

## 2018-01-01 PROCEDURE — 6370000000 HC RX 637 (ALT 250 FOR IP): Performed by: PERSONAL EMERGENCY RESPONSE ATTENDANT

## 2018-01-01 PROCEDURE — 86901 BLOOD TYPING SEROLOGIC RH(D): CPT

## 2018-01-01 PROCEDURE — 83735 ASSAY OF MAGNESIUM: CPT

## 2018-01-01 PROCEDURE — 93296 REM INTERROG EVL PM/IDS: CPT

## 2018-01-01 PROCEDURE — G8987 SELF CARE CURRENT STATUS: HCPCS

## 2018-01-01 PROCEDURE — G8427 DOCREV CUR MEDS BY ELIG CLIN: HCPCS | Performed by: FAMILY MEDICINE

## 2018-01-01 PROCEDURE — 84484 ASSAY OF TROPONIN QUANT: CPT

## 2018-01-01 PROCEDURE — 1090F PRES/ABSN URINE INCON ASSESS: CPT | Performed by: FAMILY MEDICINE

## 2018-01-01 PROCEDURE — 82553 CREATINE MB FRACTION: CPT

## 2018-01-01 PROCEDURE — 99213 OFFICE O/P EST LOW 20 MIN: CPT | Performed by: FAMILY MEDICINE

## 2018-01-01 PROCEDURE — G8417 CALC BMI ABV UP PARAM F/U: HCPCS | Performed by: FAMILY MEDICINE

## 2018-01-01 PROCEDURE — 82550 ASSAY OF CK (CPK): CPT

## 2018-01-01 PROCEDURE — 86923 COMPATIBILITY TEST ELECTRIC: CPT

## 2018-01-01 PROCEDURE — 97112 NEUROMUSCULAR REEDUCATION: CPT

## 2018-01-01 PROCEDURE — 85730 THROMBOPLASTIN TIME PARTIAL: CPT

## 2018-01-01 PROCEDURE — 97140 MANUAL THERAPY 1/> REGIONS: CPT

## 2018-01-01 PROCEDURE — G8484 FLU IMMUNIZE NO ADMIN: HCPCS | Performed by: FAMILY MEDICINE

## 2018-01-01 PROCEDURE — 87086 URINE CULTURE/COLONY COUNT: CPT

## 2018-01-01 RX ORDER — ACETAMINOPHEN 325 MG/1
650 TABLET ORAL EVERY 4 HOURS PRN
Status: DISCONTINUED | OUTPATIENT
Start: 2018-01-01 | End: 2018-01-01 | Stop reason: HOSPADM

## 2018-01-01 RX ORDER — DEXTROSE MONOHYDRATE 25 G/50ML
12.5 INJECTION, SOLUTION INTRAVENOUS PRN
Status: DISCONTINUED | OUTPATIENT
Start: 2018-01-01 | End: 2018-01-01 | Stop reason: HOSPADM

## 2018-01-01 RX ORDER — DONEPEZIL HYDROCHLORIDE 5 MG/1
5 TABLET, FILM COATED ORAL NIGHTLY
Status: DISCONTINUED | OUTPATIENT
Start: 2018-01-01 | End: 2018-01-01 | Stop reason: HOSPADM

## 2018-01-01 RX ORDER — CARVEDILOL 12.5 MG/1
12.5 TABLET ORAL 2 TIMES DAILY WITH MEALS
Status: DISCONTINUED | OUTPATIENT
Start: 2018-01-01 | End: 2018-01-01 | Stop reason: HOSPADM

## 2018-01-01 RX ORDER — NICOTINE POLACRILEX 4 MG
15 LOZENGE BUCCAL PRN
Status: DISCONTINUED | OUTPATIENT
Start: 2018-01-01 | End: 2018-01-01 | Stop reason: HOSPADM

## 2018-01-01 RX ORDER — INSULIN LISPRO 100 [IU]/ML
INJECTION, SUSPENSION SUBCUTANEOUS
Qty: 5 PEN | Refills: 0
Start: 2018-01-01

## 2018-01-01 RX ORDER — ISOSORBIDE MONONITRATE 60 MG/1
60 TABLET, EXTENDED RELEASE ORAL DAILY
Status: DISCONTINUED | OUTPATIENT
Start: 2018-01-01 | End: 2018-01-01 | Stop reason: HOSPADM

## 2018-01-01 RX ORDER — DEXTROSE MONOHYDRATE 50 MG/ML
100 INJECTION, SOLUTION INTRAVENOUS PRN
Status: DISCONTINUED | OUTPATIENT
Start: 2018-01-01 | End: 2018-01-01 | Stop reason: HOSPADM

## 2018-01-01 RX ORDER — CARVEDILOL 6.25 MG/1
6.25 TABLET ORAL 2 TIMES DAILY WITH MEALS
Status: DISCONTINUED | OUTPATIENT
Start: 2018-01-01 | End: 2018-01-01

## 2018-01-01 RX ORDER — DEXTROSE AND SODIUM CHLORIDE 5; .9 G/100ML; G/100ML
INJECTION, SOLUTION INTRAVENOUS ONCE
Status: COMPLETED | OUTPATIENT
Start: 2018-01-01 | End: 2018-01-01

## 2018-01-01 RX ORDER — TORSEMIDE 20 MG/1
40 TABLET ORAL DAILY
Status: DISCONTINUED | OUTPATIENT
Start: 2018-01-01 | End: 2018-01-01 | Stop reason: HOSPADM

## 2018-01-01 RX ORDER — PANTOPRAZOLE SODIUM 20 MG/1
20 TABLET, DELAYED RELEASE ORAL
Qty: 30 TABLET | Refills: 3 | Status: SHIPPED | OUTPATIENT
Start: 2018-01-01 | End: 2018-01-01

## 2018-01-01 RX ORDER — TORSEMIDE 20 MG/1
20 TABLET ORAL 2 TIMES DAILY
Qty: 60 TABLET | Refills: 1 | Status: ON HOLD | OUTPATIENT
Start: 2018-01-01 | End: 2018-01-01 | Stop reason: HOSPADM

## 2018-01-01 RX ORDER — M-VIT,TX,IRON,MINS/CALC/FOLIC 27MG-0.4MG
1 TABLET ORAL DAILY
COMMUNITY

## 2018-01-01 RX ORDER — TRAMADOL HYDROCHLORIDE 50 MG/1
25 TABLET ORAL EVERY 6 HOURS PRN
Status: DISCONTINUED | OUTPATIENT
Start: 2018-01-01 | End: 2018-01-01 | Stop reason: HOSPADM

## 2018-01-01 RX ORDER — CARVEDILOL 12.5 MG/1
12.5 TABLET ORAL 2 TIMES DAILY WITH MEALS
Status: DISCONTINUED | OUTPATIENT
Start: 2018-01-01 | End: 2018-01-01

## 2018-01-01 RX ORDER — CARVEDILOL 6.25 MG/1
6.25 TABLET ORAL 2 TIMES DAILY WITH MEALS
Status: DISCONTINUED | OUTPATIENT
Start: 2018-01-01 | End: 2018-01-01 | Stop reason: HOSPADM

## 2018-01-01 RX ORDER — NICOTINE POLACRILEX 4 MG
15 LOZENGE BUCCAL PRN
Status: CANCELLED | OUTPATIENT
Start: 2018-01-01

## 2018-01-01 RX ORDER — INSULIN LISPRO 100 [IU]/ML
12 INJECTION, SUSPENSION SUBCUTANEOUS
COMMUNITY
Start: 2018-01-01 | End: 2018-01-01 | Stop reason: SDUPTHER

## 2018-01-01 RX ORDER — ISOSORBIDE MONONITRATE 60 MG/1
60 TABLET, EXTENDED RELEASE ORAL DAILY
Status: CANCELLED | OUTPATIENT
Start: 2018-01-01

## 2018-01-01 RX ORDER — PANTOPRAZOLE SODIUM 20 MG/1
20 TABLET, DELAYED RELEASE ORAL
Status: DISCONTINUED | OUTPATIENT
Start: 2018-01-01 | End: 2018-01-01 | Stop reason: HOSPADM

## 2018-01-01 RX ORDER — FERROUS SULFATE 325(65) MG
325 TABLET ORAL
COMMUNITY

## 2018-01-01 RX ORDER — CARVEDILOL 12.5 MG/1
12.5 TABLET ORAL 2 TIMES DAILY WITH MEALS
Status: CANCELLED | OUTPATIENT
Start: 2018-01-01

## 2018-01-01 RX ORDER — HYDRALAZINE HYDROCHLORIDE 20 MG/ML
10 INJECTION INTRAMUSCULAR; INTRAVENOUS EVERY 6 HOURS PRN
Status: DISCONTINUED | OUTPATIENT
Start: 2018-01-01 | End: 2018-01-01 | Stop reason: HOSPADM

## 2018-01-01 RX ORDER — CARVEDILOL 12.5 MG/1
12.5 TABLET ORAL 2 TIMES DAILY WITH MEALS
DISCHARGE
Start: 2018-01-01 | End: 2018-01-01 | Stop reason: DRUGHIGH

## 2018-01-01 RX ORDER — FINASTERIDE 5 MG/1
5 TABLET, FILM COATED ORAL DAILY
COMMUNITY
End: 2018-01-01 | Stop reason: CLARIF

## 2018-01-01 RX ORDER — TORSEMIDE 20 MG/1
20 TABLET ORAL DAILY
Status: DISCONTINUED | OUTPATIENT
Start: 2018-01-01 | End: 2018-01-01 | Stop reason: HOSPADM

## 2018-01-01 RX ORDER — 0.9 % SODIUM CHLORIDE 0.9 %
10 VIAL (ML) INJECTION 2 TIMES DAILY
Status: DISCONTINUED | OUTPATIENT
Start: 2018-01-01 | End: 2018-01-01

## 2018-01-01 RX ORDER — ACETAMINOPHEN 500 MG
500 TABLET ORAL NIGHTLY
Status: DISCONTINUED | OUTPATIENT
Start: 2018-01-01 | End: 2018-01-01 | Stop reason: HOSPADM

## 2018-01-01 RX ORDER — 0.9 % SODIUM CHLORIDE 0.9 %
500 INTRAVENOUS SOLUTION INTRAVENOUS ONCE
Status: DISCONTINUED | OUTPATIENT
Start: 2018-01-01 | End: 2018-01-01

## 2018-01-01 RX ORDER — INSULIN LISPRO 100 [IU]/ML
INJECTION, SUSPENSION SUBCUTANEOUS
Qty: 5 PEN | Refills: 0 | Status: SHIPPED | OUTPATIENT
Start: 2018-01-01 | End: 2018-01-01 | Stop reason: SDUPTHER

## 2018-01-01 RX ORDER — SODIUM CHLORIDE 0.9 % (FLUSH) 0.9 %
10 SYRINGE (ML) INJECTION PRN
Status: DISCONTINUED | OUTPATIENT
Start: 2018-01-01 | End: 2018-01-01 | Stop reason: HOSPADM

## 2018-01-01 RX ORDER — INSULIN LISPRO 100 [IU]/ML
INJECTION, SUSPENSION SUBCUTANEOUS
Qty: 5 PEN | Refills: 0
Start: 2018-01-01 | End: 2018-01-01 | Stop reason: SDUPTHER

## 2018-01-01 RX ORDER — TRAMADOL HYDROCHLORIDE 50 MG/1
25 TABLET ORAL NIGHTLY
Status: DISCONTINUED | OUTPATIENT
Start: 2018-01-01 | End: 2018-01-01 | Stop reason: HOSPADM

## 2018-01-01 RX ORDER — PANTOPRAZOLE SODIUM 20 MG/1
20 TABLET, DELAYED RELEASE ORAL 2 TIMES DAILY
DISCHARGE
Start: 2018-01-01

## 2018-01-01 RX ORDER — DEXTROSE MONOHYDRATE 50 MG/ML
100 INJECTION, SOLUTION INTRAVENOUS PRN
Status: CANCELLED | OUTPATIENT
Start: 2018-01-01

## 2018-01-01 RX ORDER — 0.9 % SODIUM CHLORIDE 0.9 %
500 INTRAVENOUS SOLUTION INTRAVENOUS ONCE
Status: COMPLETED | OUTPATIENT
Start: 2018-01-01 | End: 2018-01-01

## 2018-01-01 RX ORDER — SODIUM CHLORIDE 9 MG/ML
INJECTION, SOLUTION INTRAVENOUS CONTINUOUS
Status: DISCONTINUED | OUTPATIENT
Start: 2018-01-01 | End: 2018-01-01

## 2018-01-01 RX ORDER — FUROSEMIDE 10 MG/ML
60 INJECTION INTRAMUSCULAR; INTRAVENOUS ONCE
Status: COMPLETED | OUTPATIENT
Start: 2018-01-01 | End: 2018-01-01

## 2018-01-01 RX ORDER — PANTOPRAZOLE SODIUM 40 MG/1
40 TABLET, DELAYED RELEASE ORAL ONCE
Status: DISCONTINUED | OUTPATIENT
Start: 2018-01-01 | End: 2018-01-01

## 2018-01-01 RX ORDER — PANTOPRAZOLE SODIUM 20 MG/1
20 TABLET, DELAYED RELEASE ORAL
Status: CANCELLED | OUTPATIENT
Start: 2018-01-01

## 2018-01-01 RX ORDER — NITROGLYCERIN 0.4 MG/1
0.4 TABLET SUBLINGUAL EVERY 5 MIN PRN
Status: DISCONTINUED | OUTPATIENT
Start: 2018-01-01 | End: 2018-01-01 | Stop reason: HOSPADM

## 2018-01-01 RX ORDER — DEXTROSE MONOHYDRATE 25 G/50ML
12.5 INJECTION, SOLUTION INTRAVENOUS PRN
Status: CANCELLED | OUTPATIENT
Start: 2018-01-01

## 2018-01-01 RX ORDER — SODIUM CHLORIDE 0.9 % (FLUSH) 0.9 %
3 SYRINGE (ML) INJECTION EVERY 8 HOURS
Status: DISCONTINUED | OUTPATIENT
Start: 2018-01-01 | End: 2018-01-01 | Stop reason: HOSPADM

## 2018-01-01 RX ORDER — SODIUM CHLORIDE 0.9 % (FLUSH) 0.9 %
10 SYRINGE (ML) INJECTION EVERY 12 HOURS SCHEDULED
Status: DISCONTINUED | OUTPATIENT
Start: 2018-01-01 | End: 2018-01-01 | Stop reason: HOSPADM

## 2018-01-01 RX ORDER — ONDANSETRON 4 MG/1
4 TABLET, FILM COATED ORAL EVERY 8 HOURS PRN
Status: DISCONTINUED | OUTPATIENT
Start: 2018-01-01 | End: 2018-01-01 | Stop reason: HOSPADM

## 2018-01-01 RX ORDER — SODIUM PHOSPHATE, DIBASIC AND SODIUM PHOSPHATE, MONOBASIC 7; 19 G/133ML; G/133ML
1 ENEMA RECTAL
Status: DISPENSED | OUTPATIENT
Start: 2018-01-01 | End: 2018-01-01

## 2018-01-01 RX ORDER — PANTOPRAZOLE SODIUM 20 MG/1
20 TABLET, DELAYED RELEASE ORAL 2 TIMES DAILY
Status: DISCONTINUED | OUTPATIENT
Start: 2018-01-01 | End: 2018-01-01 | Stop reason: HOSPADM

## 2018-01-01 RX ORDER — M-VIT,TX,IRON,MINS/CALC/FOLIC 27MG-0.4MG
1 TABLET ORAL DAILY
Status: DISCONTINUED | OUTPATIENT
Start: 2018-01-01 | End: 2018-01-01

## 2018-01-01 RX ORDER — DEXTROSE MONOHYDRATE 25 G/50ML
INJECTION, SOLUTION INTRAVENOUS
Status: DISCONTINUED
Start: 2018-01-01 | End: 2018-01-01

## 2018-01-01 RX ORDER — CALCIUM CARBONATE/VITAMIN D3 500-10/5ML
1 LIQUID (ML) ORAL DAILY
Refills: 3 | COMMUNITY
Start: 2018-01-01

## 2018-01-01 RX ORDER — SPIRONOLACTONE 25 MG/1
TABLET ORAL
Qty: 90 TABLET | Refills: 3 | Status: ON HOLD | OUTPATIENT
Start: 2018-01-01 | End: 2018-01-01 | Stop reason: HOSPADM

## 2018-01-01 RX ORDER — DILTIAZEM HYDROCHLORIDE EXTENDED-RELEASE TABLETS 360 MG/1
360 TABLET, EXTENDED RELEASE ORAL DAILY
COMMUNITY
End: 2018-01-01 | Stop reason: CLARIF

## 2018-01-01 RX ORDER — ACETAMINOPHEN 500 MG
500 TABLET ORAL EVERY 6 HOURS PRN
Status: DISCONTINUED | OUTPATIENT
Start: 2018-01-01 | End: 2018-01-01 | Stop reason: HOSPADM

## 2018-01-01 RX ORDER — TORSEMIDE 20 MG/1
20 TABLET ORAL DAILY
Qty: 30 TABLET | Refills: 3 | Status: SHIPPED | OUTPATIENT
Start: 2018-01-01

## 2018-01-01 RX ORDER — DONEPEZIL HYDROCHLORIDE 5 MG/1
5 TABLET, FILM COATED ORAL NIGHTLY
Qty: 30 TABLET | Refills: 3 | Status: SHIPPED | OUTPATIENT
Start: 2018-01-01

## 2018-01-01 RX ORDER — ACETAMINOPHEN 80 MG
TABLET,CHEWABLE ORAL ONCE
Status: COMPLETED | OUTPATIENT
Start: 2018-01-01 | End: 2018-01-01

## 2018-01-01 RX ORDER — TORSEMIDE 20 MG/1
20 TABLET ORAL DAILY
Status: CANCELLED | OUTPATIENT
Start: 2018-01-01

## 2018-01-01 RX ORDER — ONDANSETRON 2 MG/ML
4 INJECTION INTRAMUSCULAR; INTRAVENOUS EVERY 6 HOURS PRN
Status: DISCONTINUED | OUTPATIENT
Start: 2018-01-01 | End: 2018-01-01 | Stop reason: HOSPADM

## 2018-01-01 RX ORDER — HYDRALAZINE HYDROCHLORIDE 20 MG/ML
10 INJECTION INTRAMUSCULAR; INTRAVENOUS ONCE
Status: COMPLETED | OUTPATIENT
Start: 2018-01-01 | End: 2018-01-01

## 2018-01-01 RX ORDER — TORSEMIDE 20 MG/1
30 TABLET ORAL DAILY
Qty: 30 TABLET | Refills: 0
Start: 2018-01-01 | End: 2018-01-01 | Stop reason: SDUPTHER

## 2018-01-01 RX ORDER — ISOSORBIDE MONONITRATE 60 MG/1
60 TABLET, EXTENDED RELEASE ORAL DAILY
Status: DISCONTINUED | OUTPATIENT
Start: 2018-01-01 | End: 2018-01-01

## 2018-01-01 RX ORDER — FUROSEMIDE 10 MG/ML
20 INJECTION INTRAMUSCULAR; INTRAVENOUS ONCE
Status: DISCONTINUED | OUTPATIENT
Start: 2018-01-01 | End: 2018-01-01

## 2018-01-01 RX ORDER — BISACODYL 10 MG
10 SUPPOSITORY, RECTAL RECTAL DAILY PRN
Status: DISCONTINUED | OUTPATIENT
Start: 2018-01-01 | End: 2018-01-01 | Stop reason: HOSPADM

## 2018-01-01 RX ORDER — TORSEMIDE 20 MG/1
10 TABLET ORAL DAILY
Qty: 30 TABLET | Refills: 0 | Status: ON HOLD
Start: 2018-01-01 | End: 2018-01-01 | Stop reason: HOSPADM

## 2018-01-01 RX ORDER — ACETAMINOPHEN 325 MG/1
650 TABLET ORAL EVERY 4 HOURS PRN
Status: CANCELLED | OUTPATIENT
Start: 2018-01-01

## 2018-01-01 RX ORDER — ONDANSETRON 2 MG/ML
4 INJECTION INTRAMUSCULAR; INTRAVENOUS EVERY 6 HOURS PRN
Status: CANCELLED | OUTPATIENT
Start: 2018-01-01

## 2018-01-01 RX ORDER — FERROUS SULFATE 325(65) MG
325 TABLET ORAL
Status: DISCONTINUED | OUTPATIENT
Start: 2018-01-01 | End: 2018-01-01

## 2018-01-01 RX ORDER — INSULIN LISPRO 100 [IU]/ML
12 INJECTION, SUSPENSION SUBCUTANEOUS
Status: DISCONTINUED | OUTPATIENT
Start: 2018-01-01 | End: 2018-01-01 | Stop reason: SDUPTHER

## 2018-01-01 RX ORDER — ISOSORBIDE MONONITRATE 60 MG/1
90 TABLET, EXTENDED RELEASE ORAL EVERY MORNING
Qty: 30 TABLET | Refills: 3 | Status: SHIPPED | OUTPATIENT
Start: 2018-01-01

## 2018-01-01 RX ORDER — IPRATROPIUM BROMIDE AND ALBUTEROL SULFATE 2.5; .5 MG/3ML; MG/3ML
1 SOLUTION RESPIRATORY (INHALATION) ONCE
Status: COMPLETED | OUTPATIENT
Start: 2018-01-01 | End: 2018-01-01

## 2018-01-01 RX ORDER — TORSEMIDE 20 MG/1
20 TABLET ORAL DAILY
Status: DISCONTINUED | OUTPATIENT
Start: 2018-01-01 | End: 2018-01-01

## 2018-01-01 RX ORDER — CARVEDILOL 6.25 MG/1
6.25 TABLET ORAL 2 TIMES DAILY WITH MEALS
COMMUNITY

## 2018-01-01 RX ORDER — PANTOPRAZOLE SODIUM 40 MG/10ML
40 INJECTION, POWDER, LYOPHILIZED, FOR SOLUTION INTRAVENOUS 2 TIMES DAILY
Status: DISCONTINUED | OUTPATIENT
Start: 2018-01-01 | End: 2018-01-01

## 2018-01-01 RX ORDER — LIDOCAINE 50 MG/G
3 PATCH TOPICAL DAILY
Status: DISCONTINUED | OUTPATIENT
Start: 2018-01-01 | End: 2018-01-01 | Stop reason: HOSPADM

## 2018-01-01 RX ORDER — INSULIN LISPRO 100 [IU]/ML
12 INJECTION, SUSPENSION SUBCUTANEOUS
Status: DISCONTINUED | OUTPATIENT
Start: 2018-01-01 | End: 2018-01-01 | Stop reason: CLARIF

## 2018-01-01 RX ORDER — TORSEMIDE 20 MG/1
20 TABLET ORAL DAILY
Refills: 0 | DISCHARGE
Start: 2018-01-01 | End: 2018-01-01 | Stop reason: SDUPTHER

## 2018-01-01 RX ORDER — PANTOPRAZOLE SODIUM 20 MG/1
20 TABLET, DELAYED RELEASE ORAL
Qty: 30 TABLET | Refills: 3 | Status: SHIPPED | OUTPATIENT
Start: 2018-01-01 | End: 2018-01-01 | Stop reason: SDUPTHER

## 2018-01-01 RX ORDER — LANCETS 33 GAUGE
EACH MISCELLANEOUS
Qty: 100 EACH | Refills: 5 | Status: SHIPPED | OUTPATIENT
Start: 2018-01-01

## 2018-01-01 RX ADMIN — Medication 3 ML: at 11:52

## 2018-01-01 RX ADMIN — CARVEDILOL 6.25 MG: 6.25 TABLET, FILM COATED ORAL at 17:22

## 2018-01-01 RX ADMIN — INSULIN LISPRO 4 UNITS: 100 INJECTION, SOLUTION INTRAVENOUS; SUBCUTANEOUS at 12:47

## 2018-01-01 RX ADMIN — PANTOPRAZOLE SODIUM 20 MG: 20 TABLET, DELAYED RELEASE ORAL at 08:02

## 2018-01-01 RX ADMIN — CARVEDILOL 6.25 MG: 6.25 TABLET, FILM COATED ORAL at 10:22

## 2018-01-01 RX ADMIN — IPRATROPIUM BROMIDE AND ALBUTEROL SULFATE 1 AMPULE: .5; 3 SOLUTION RESPIRATORY (INHALATION) at 05:16

## 2018-01-01 RX ADMIN — CARVEDILOL 12.5 MG: 12.5 TABLET, FILM COATED ORAL at 18:34

## 2018-01-01 RX ADMIN — TORSEMIDE 20 MG: 20 TABLET ORAL at 08:34

## 2018-01-01 RX ADMIN — SODIUM CHLORIDE: 9 INJECTION, SOLUTION INTRAVENOUS at 22:26

## 2018-01-01 RX ADMIN — INSULIN LISPRO 6 UNITS: 100 INJECTION, SUSPENSION SUBCUTANEOUS at 12:30

## 2018-01-01 RX ADMIN — PANTOPRAZOLE SODIUM 20 MG: 20 TABLET, DELAYED RELEASE ORAL at 17:58

## 2018-01-01 RX ADMIN — PANTOPRAZOLE SODIUM 20 MG: 20 TABLET, DELAYED RELEASE ORAL at 16:47

## 2018-01-01 RX ADMIN — PANTOPRAZOLE SODIUM 40 MG: 40 INJECTION, POWDER, FOR SOLUTION INTRAVENOUS at 10:21

## 2018-01-01 RX ADMIN — PANTOPRAZOLE SODIUM 20 MG: 20 TABLET, DELAYED RELEASE ORAL at 10:48

## 2018-01-01 RX ADMIN — CARVEDILOL 12.5 MG: 12.5 TABLET, FILM COATED ORAL at 10:12

## 2018-01-01 RX ADMIN — SODIUM CHLORIDE: 9 INJECTION, SOLUTION INTRAVENOUS at 22:30

## 2018-01-01 RX ADMIN — TRAMADOL HYDROCHLORIDE 25 MG: 50 TABLET, FILM COATED ORAL at 20:09

## 2018-01-01 RX ADMIN — FERROUS SULFATE TAB 325 MG (65 MG ELEMENTAL FE) 325 MG: 325 (65 FE) TAB at 10:49

## 2018-01-01 RX ADMIN — CARVEDILOL 6.25 MG: 6.25 TABLET, FILM COATED ORAL at 07:51

## 2018-01-01 RX ADMIN — CARVEDILOL 6.25 MG: 6.25 TABLET, FILM COATED ORAL at 18:20

## 2018-01-01 RX ADMIN — Medication 10 ML: at 08:52

## 2018-01-01 RX ADMIN — CARVEDILOL 6.25 MG: 6.25 TABLET, FILM COATED ORAL at 17:52

## 2018-01-01 RX ADMIN — Medication 10 ML: at 08:02

## 2018-01-01 RX ADMIN — CARVEDILOL 6.25 MG: 6.25 TABLET, FILM COATED ORAL at 17:29

## 2018-01-01 RX ADMIN — INSULIN LISPRO 3 UNITS: 100 INJECTION, SOLUTION INTRAVENOUS; SUBCUTANEOUS at 18:23

## 2018-01-01 RX ADMIN — Medication 10 ML: at 10:22

## 2018-01-01 RX ADMIN — MULTIPLE VITAMINS W/ MINERALS TAB 1 TABLET: TAB at 10:48

## 2018-01-01 RX ADMIN — ISOSORBIDE MONONITRATE 60 MG: 60 TABLET, EXTENDED RELEASE ORAL at 08:34

## 2018-01-01 RX ADMIN — PANTOPRAZOLE SODIUM 20 MG: 20 TABLET, DELAYED RELEASE ORAL at 17:29

## 2018-01-01 RX ADMIN — ISOSORBIDE MONONITRATE 60 MG: 60 TABLET, EXTENDED RELEASE ORAL at 09:19

## 2018-01-01 RX ADMIN — Medication 10 ML: at 20:57

## 2018-01-01 RX ADMIN — INSULIN LISPRO 2 UNITS: 100 INJECTION, SOLUTION INTRAVENOUS; SUBCUTANEOUS at 08:05

## 2018-01-01 RX ADMIN — CARVEDILOL 6.25 MG: 6.25 TABLET, FILM COATED ORAL at 08:33

## 2018-01-01 RX ADMIN — TORSEMIDE 20 MG: 20 TABLET ORAL at 08:33

## 2018-01-01 RX ADMIN — PANTOPRAZOLE SODIUM 20 MG: 20 TABLET, DELAYED RELEASE ORAL at 05:38

## 2018-01-01 RX ADMIN — CARVEDILOL 6.25 MG: 6.25 TABLET, FILM COATED ORAL at 17:18

## 2018-01-01 RX ADMIN — ISOSORBIDE MONONITRATE 60 MG: 60 TABLET, EXTENDED RELEASE ORAL at 07:51

## 2018-01-01 RX ADMIN — INSULIN LISPRO 5 UNITS: 100 INJECTION, SUSPENSION SUBCUTANEOUS at 17:24

## 2018-01-01 RX ADMIN — Medication 10 ML: at 09:44

## 2018-01-01 RX ADMIN — TRAMADOL HYDROCHLORIDE 25 MG: 50 TABLET, FILM COATED ORAL at 21:43

## 2018-01-01 RX ADMIN — ACETAMINOPHEN 500 MG: 500 TABLET, FILM COATED ORAL at 21:43

## 2018-01-01 RX ADMIN — INSULIN LISPRO 5 UNITS: 100 INJECTION, SUSPENSION SUBCUTANEOUS at 10:47

## 2018-01-01 RX ADMIN — CARVEDILOL 12.5 MG: 12.5 TABLET, FILM COATED ORAL at 08:04

## 2018-01-01 RX ADMIN — DONEPEZIL HYDROCHLORIDE 5 MG: 5 TABLET, FILM COATED ORAL at 20:09

## 2018-01-01 RX ADMIN — PANTOPRAZOLE SODIUM 20 MG: 20 TABLET, DELAYED RELEASE ORAL at 17:18

## 2018-01-01 RX ADMIN — TRAMADOL HYDROCHLORIDE 25 MG: 50 TABLET, FILM COATED ORAL at 22:04

## 2018-01-01 RX ADMIN — INSULIN LISPRO 6 UNITS: 100 INJECTION, SUSPENSION SUBCUTANEOUS at 13:23

## 2018-01-01 RX ADMIN — INSULIN LISPRO 8 UNITS: 100 INJECTION, SUSPENSION SUBCUTANEOUS at 17:21

## 2018-01-01 RX ADMIN — CARVEDILOL 6.25 MG: 6.25 TABLET, FILM COATED ORAL at 17:11

## 2018-01-01 RX ADMIN — ISOSORBIDE MONONITRATE 60 MG: 60 TABLET, EXTENDED RELEASE ORAL at 10:48

## 2018-01-01 RX ADMIN — ACETAMINOPHEN 500 MG: 500 TABLET, FILM COATED ORAL at 20:43

## 2018-01-01 RX ADMIN — PANTOPRAZOLE SODIUM 40 MG: 40 INJECTION, POWDER, FOR SOLUTION INTRAVENOUS at 08:51

## 2018-01-01 RX ADMIN — Medication 10 ML: at 20:54

## 2018-01-01 RX ADMIN — TORSEMIDE 20 MG: 20 TABLET ORAL at 10:12

## 2018-01-01 RX ADMIN — Medication 10 ML: at 22:30

## 2018-01-01 RX ADMIN — CARVEDILOL 12.5 MG: 12.5 TABLET, FILM COATED ORAL at 16:47

## 2018-01-01 RX ADMIN — TORSEMIDE 20 MG: 20 TABLET ORAL at 08:04

## 2018-01-01 RX ADMIN — Medication 3 ML: at 12:47

## 2018-01-01 RX ADMIN — ISOSORBIDE MONONITRATE 60 MG: 60 TABLET, EXTENDED RELEASE ORAL at 08:35

## 2018-01-01 RX ADMIN — TORSEMIDE 20 MG: 20 TABLET ORAL at 09:46

## 2018-01-01 RX ADMIN — ACETAMINOPHEN 500 MG: 500 TABLET, FILM COATED ORAL at 22:04

## 2018-01-01 RX ADMIN — Medication 10 ML: at 20:52

## 2018-01-01 RX ADMIN — ISOSORBIDE MONONITRATE 60 MG: 60 TABLET, EXTENDED RELEASE ORAL at 08:16

## 2018-01-01 RX ADMIN — INSULIN LISPRO 12 UNITS: 100 INJECTION, SUSPENSION SUBCUTANEOUS at 12:48

## 2018-01-01 RX ADMIN — INSULIN LISPRO 6 UNITS: 100 INJECTION, SUSPENSION SUBCUTANEOUS at 13:38

## 2018-01-01 RX ADMIN — PANTOPRAZOLE SODIUM 20 MG: 20 TABLET, DELAYED RELEASE ORAL at 05:29

## 2018-01-01 RX ADMIN — TORSEMIDE 20 MG: 20 TABLET ORAL at 08:30

## 2018-01-01 RX ADMIN — PANTOPRAZOLE SODIUM 40 MG: 40 INJECTION, POWDER, FOR SOLUTION INTRAVENOUS at 20:51

## 2018-01-01 RX ADMIN — INSULIN LISPRO 5 UNITS: 100 INJECTION, SUSPENSION SUBCUTANEOUS at 12:37

## 2018-01-01 RX ADMIN — TORSEMIDE 20 MG: 20 TABLET ORAL at 09:19

## 2018-01-01 RX ADMIN — SODIUM CHLORIDE: 9 INJECTION, SOLUTION INTRAVENOUS at 11:51

## 2018-01-01 RX ADMIN — Medication 10 ML: at 09:06

## 2018-01-01 RX ADMIN — ISOSORBIDE MONONITRATE 90 MG: 60 TABLET, EXTENDED RELEASE ORAL at 09:44

## 2018-01-01 RX ADMIN — NITROGLYCERIN 0.5 INCH: 20 OINTMENT TOPICAL at 05:28

## 2018-01-01 RX ADMIN — INSULIN LISPRO 4 UNITS: 100 INJECTION, SOLUTION INTRAVENOUS; SUBCUTANEOUS at 09:48

## 2018-01-01 RX ADMIN — INSULIN LISPRO 11 UNITS: 100 INJECTION, SUSPENSION SUBCUTANEOUS at 18:24

## 2018-01-01 RX ADMIN — CARVEDILOL 6.25 MG: 6.25 TABLET, FILM COATED ORAL at 10:50

## 2018-01-01 RX ADMIN — TORSEMIDE 20 MG: 20 TABLET ORAL at 09:39

## 2018-01-01 RX ADMIN — INSULIN LISPRO 6 UNITS: 100 INJECTION, SUSPENSION SUBCUTANEOUS at 17:33

## 2018-01-01 RX ADMIN — ACETAMINOPHEN 500 MG: 500 TABLET, FILM COATED ORAL at 20:10

## 2018-01-01 RX ADMIN — ISOSORBIDE MONONITRATE 60 MG: 60 TABLET, EXTENDED RELEASE ORAL at 15:00

## 2018-01-01 RX ADMIN — INSULIN LISPRO 6 UNITS: 100 INJECTION, SUSPENSION SUBCUTANEOUS at 13:07

## 2018-01-01 RX ADMIN — PANTOPRAZOLE SODIUM 40 MG: 40 INJECTION, POWDER, FOR SOLUTION INTRAVENOUS at 22:30

## 2018-01-01 RX ADMIN — Medication 10 ML: at 09:04

## 2018-01-01 RX ADMIN — CARVEDILOL 6.25 MG: 6.25 TABLET, FILM COATED ORAL at 16:57

## 2018-01-01 RX ADMIN — PANTOPRAZOLE SODIUM 20 MG: 20 TABLET, DELAYED RELEASE ORAL at 17:53

## 2018-01-01 RX ADMIN — INSULIN LISPRO 6 UNITS: 100 INJECTION, SUSPENSION SUBCUTANEOUS at 12:27

## 2018-01-01 RX ADMIN — INSULIN LISPRO 5 UNITS: 100 INJECTION, SUSPENSION SUBCUTANEOUS at 17:57

## 2018-01-01 RX ADMIN — TRAMADOL HYDROCHLORIDE 25 MG: 50 TABLET, FILM COATED ORAL at 10:18

## 2018-01-01 RX ADMIN — TORSEMIDE 20 MG: 20 TABLET ORAL at 09:44

## 2018-01-01 RX ADMIN — ISOSORBIDE MONONITRATE 60 MG: 60 TABLET, EXTENDED RELEASE ORAL at 09:46

## 2018-01-01 RX ADMIN — TORSEMIDE 20 MG: 20 TABLET ORAL at 07:51

## 2018-01-01 RX ADMIN — CARVEDILOL 6.25 MG: 6.25 TABLET, FILM COATED ORAL at 08:02

## 2018-01-01 RX ADMIN — ISOSORBIDE MONONITRATE 60 MG: 60 TABLET, EXTENDED RELEASE ORAL at 09:40

## 2018-01-01 RX ADMIN — CARVEDILOL 12.5 MG: 12.5 TABLET, FILM COATED ORAL at 09:19

## 2018-01-01 RX ADMIN — INSULIN LISPRO 5 UNITS: 100 INJECTION, SUSPENSION SUBCUTANEOUS at 12:41

## 2018-01-01 RX ADMIN — INSULIN LISPRO 5 UNITS: 100 INJECTION, SUSPENSION SUBCUTANEOUS at 17:30

## 2018-01-01 RX ADMIN — ISOSORBIDE MONONITRATE 60 MG: 60 TABLET, EXTENDED RELEASE ORAL at 09:50

## 2018-01-01 RX ADMIN — ACETAMINOPHEN 650 MG: 325 TABLET ORAL at 10:50

## 2018-01-01 RX ADMIN — PANTOPRAZOLE SODIUM 20 MG: 20 TABLET, DELAYED RELEASE ORAL at 18:34

## 2018-01-01 RX ADMIN — SODIUM CHLORIDE: 9 INJECTION, SOLUTION INTRAVENOUS at 05:20

## 2018-01-01 RX ADMIN — PANTOPRAZOLE SODIUM 20 MG: 20 TABLET, DELAYED RELEASE ORAL at 06:37

## 2018-01-01 RX ADMIN — DEXTROSE AND SODIUM CHLORIDE: 5; 900 INJECTION, SOLUTION INTRAVENOUS at 00:33

## 2018-01-01 RX ADMIN — Medication 3 ML: at 08:23

## 2018-01-01 RX ADMIN — PANTOPRAZOLE SODIUM 40 MG: 40 INJECTION, POWDER, FOR SOLUTION INTRAVENOUS at 20:53

## 2018-01-01 RX ADMIN — INSULIN LISPRO 5 UNITS: 100 INJECTION, SUSPENSION SUBCUTANEOUS at 12:21

## 2018-01-01 RX ADMIN — DEXTROSE MONOHYDRATE 12.5 G: 25 INJECTION, SOLUTION INTRAVENOUS at 00:15

## 2018-01-01 RX ADMIN — CARVEDILOL 6.25 MG: 6.25 TABLET, FILM COATED ORAL at 08:30

## 2018-01-01 RX ADMIN — TORSEMIDE 20 MG: 20 TABLET ORAL at 09:50

## 2018-01-01 RX ADMIN — ISOSORBIDE MONONITRATE 60 MG: 60 TABLET, EXTENDED RELEASE ORAL at 10:12

## 2018-01-01 RX ADMIN — CARVEDILOL 6.25 MG: 6.25 TABLET, FILM COATED ORAL at 17:21

## 2018-01-01 RX ADMIN — FUROSEMIDE 60 MG: 10 INJECTION, SOLUTION INTRAVENOUS at 05:28

## 2018-01-01 RX ADMIN — INSULIN LISPRO 5 UNITS: 100 INJECTION, SUSPENSION SUBCUTANEOUS at 13:00

## 2018-01-01 RX ADMIN — INSULIN LISPRO 8 UNITS: 100 INJECTION, SUSPENSION SUBCUTANEOUS at 17:45

## 2018-01-01 RX ADMIN — PANTOPRAZOLE SODIUM 20 MG: 20 TABLET, DELAYED RELEASE ORAL at 17:36

## 2018-01-01 RX ADMIN — ACETAMINOPHEN 650 MG: 325 TABLET ORAL at 09:50

## 2018-01-01 RX ADMIN — PANTOPRAZOLE SODIUM 40 MG: 40 INJECTION, POWDER, FOR SOLUTION INTRAVENOUS at 22:27

## 2018-01-01 RX ADMIN — PANTOPRAZOLE SODIUM 40 MG: 40 INJECTION, POWDER, FOR SOLUTION INTRAVENOUS at 09:04

## 2018-01-01 RX ADMIN — INSULIN LISPRO 5 UNITS: 100 INJECTION, SUSPENSION SUBCUTANEOUS at 09:45

## 2018-01-01 RX ADMIN — TORSEMIDE 20 MG: 20 TABLET ORAL at 10:48

## 2018-01-01 RX ADMIN — INSULIN LISPRO 11 UNITS: 100 INJECTION, SUSPENSION SUBCUTANEOUS at 17:55

## 2018-01-01 RX ADMIN — CARVEDILOL 6.25 MG: 6.25 TABLET, FILM COATED ORAL at 17:17

## 2018-01-01 RX ADMIN — PANTOPRAZOLE SODIUM 20 MG: 20 TABLET, DELAYED RELEASE ORAL at 07:05

## 2018-01-01 RX ADMIN — INSULIN LISPRO 6 UNITS: 100 INJECTION, SOLUTION INTRAVENOUS; SUBCUTANEOUS at 12:04

## 2018-01-01 RX ADMIN — ISOSORBIDE MONONITRATE 60 MG: 60 TABLET, EXTENDED RELEASE ORAL at 08:33

## 2018-01-01 RX ADMIN — PANTOPRAZOLE SODIUM 20 MG: 20 TABLET, DELAYED RELEASE ORAL at 05:28

## 2018-01-01 RX ADMIN — PANTOPRAZOLE SODIUM 20 MG: 20 TABLET, DELAYED RELEASE ORAL at 06:00

## 2018-01-01 RX ADMIN — SODIUM CHLORIDE 500 ML: 9 INJECTION, SOLUTION INTRAVENOUS at 15:59

## 2018-01-01 RX ADMIN — CARVEDILOL 6.25 MG: 6.25 TABLET, FILM COATED ORAL at 09:46

## 2018-01-01 RX ADMIN — SODIUM CHLORIDE: 9 INJECTION, SOLUTION INTRAVENOUS at 00:41

## 2018-01-01 RX ADMIN — PANTOPRAZOLE SODIUM 20 MG: 20 TABLET, DELAYED RELEASE ORAL at 06:47

## 2018-01-01 RX ADMIN — TORSEMIDE 20 MG: 20 TABLET ORAL at 17:54

## 2018-01-01 RX ADMIN — PANTOPRAZOLE SODIUM 20 MG: 20 TABLET, DELAYED RELEASE ORAL at 06:59

## 2018-01-01 RX ADMIN — ACETAMINOPHEN 500 MG: 500 TABLET ORAL at 10:48

## 2018-01-01 RX ADMIN — TORSEMIDE 20 MG: 20 TABLET ORAL at 08:17

## 2018-01-01 RX ADMIN — TRAMADOL HYDROCHLORIDE 25 MG: 50 TABLET, FILM COATED ORAL at 20:46

## 2018-01-01 RX ADMIN — PANTOPRAZOLE SODIUM 20 MG: 20 TABLET, DELAYED RELEASE ORAL at 09:44

## 2018-01-01 RX ADMIN — PANTOPRAZOLE SODIUM 20 MG: 20 TABLET, DELAYED RELEASE ORAL at 17:31

## 2018-01-01 RX ADMIN — CARVEDILOL 6.25 MG: 6.25 TABLET, FILM COATED ORAL at 09:39

## 2018-01-01 RX ADMIN — SODIUM CHLORIDE: 9 INJECTION, SOLUTION INTRAVENOUS at 14:47

## 2018-01-01 RX ADMIN — PANTOPRAZOLE SODIUM 20 MG: 20 TABLET, DELAYED RELEASE ORAL at 06:28

## 2018-01-01 RX ADMIN — HYDRALAZINE HYDROCHLORIDE 10 MG: 20 INJECTION INTRAMUSCULAR; INTRAVENOUS at 03:01

## 2018-01-01 RX ADMIN — Medication 10 ML: at 09:41

## 2018-01-01 RX ADMIN — INSULIN LISPRO 1 UNITS: 100 INJECTION, SOLUTION INTRAVENOUS; SUBCUTANEOUS at 12:33

## 2018-01-01 RX ADMIN — Medication 10 ML: at 22:27

## 2018-01-01 RX ADMIN — TRAMADOL HYDROCHLORIDE 25 MG: 50 TABLET, FILM COATED ORAL at 20:43

## 2018-01-01 RX ADMIN — CARVEDILOL 12.5 MG: 12.5 TABLET, FILM COATED ORAL at 17:35

## 2018-01-01 RX ADMIN — PANTOPRAZOLE SODIUM 20 MG: 20 TABLET, DELAYED RELEASE ORAL at 20:58

## 2018-01-01 RX ADMIN — INSULIN LISPRO 8 UNITS: 100 INJECTION, SUSPENSION SUBCUTANEOUS at 17:53

## 2018-01-01 RX ADMIN — TORSEMIDE 40 MG: 20 TABLET ORAL at 08:02

## 2018-01-01 RX ADMIN — INSULIN LISPRO 12 UNITS: 100 INJECTION, SUSPENSION SUBCUTANEOUS at 12:33

## 2018-01-01 RX ADMIN — PANTOPRAZOLE SODIUM 20 MG: 20 TABLET, DELAYED RELEASE ORAL at 04:43

## 2018-01-01 RX ADMIN — CARVEDILOL 6.25 MG: 6.25 TABLET, FILM COATED ORAL at 10:48

## 2018-01-01 RX ADMIN — PANTOPRAZOLE SODIUM 20 MG: 20 TABLET, DELAYED RELEASE ORAL at 17:21

## 2018-01-01 RX ADMIN — INSULIN LISPRO 5 UNITS: 100 INJECTION, SUSPENSION SUBCUTANEOUS at 17:10

## 2018-01-01 RX ADMIN — Medication 3 ML: at 21:08

## 2018-01-01 RX ADMIN — INSULIN LISPRO 5 UNITS: 100 INJECTION, SUSPENSION SUBCUTANEOUS at 08:06

## 2018-01-01 RX ADMIN — PANTOPRAZOLE SODIUM 20 MG: 20 TABLET, DELAYED RELEASE ORAL at 20:09

## 2018-01-01 RX ADMIN — Medication 3 ML: at 05:28

## 2018-01-01 RX ADMIN — INSULIN LISPRO 4 UNITS: 100 INJECTION, SOLUTION INTRAVENOUS; SUBCUTANEOUS at 17:10

## 2018-01-01 RX ADMIN — ISOSORBIDE MONONITRATE 60 MG: 60 TABLET, EXTENDED RELEASE ORAL at 08:03

## 2018-01-01 RX ADMIN — CARVEDILOL 6.25 MG: 6.25 TABLET, FILM COATED ORAL at 09:44

## 2018-01-01 RX ADMIN — CARVEDILOL 12.5 MG: 12.5 TABLET, FILM COATED ORAL at 17:54

## 2018-01-01 RX ADMIN — PANTOPRAZOLE SODIUM 20 MG: 20 TABLET, DELAYED RELEASE ORAL at 17:22

## 2018-01-01 RX ADMIN — PANTOPRAZOLE SODIUM 20 MG: 20 TABLET, DELAYED RELEASE ORAL at 17:01

## 2018-01-01 RX ADMIN — Medication 10 ML: at 20:58

## 2018-01-01 RX ADMIN — CARVEDILOL 12.5 MG: 12.5 TABLET, FILM COATED ORAL at 17:53

## 2018-01-01 RX ADMIN — INSULIN LISPRO 5 UNITS: 100 INJECTION, SUSPENSION SUBCUTANEOUS at 12:40

## 2018-01-01 RX ADMIN — INSULIN LISPRO 5 UNITS: 100 INJECTION, SUSPENSION SUBCUTANEOUS at 18:34

## 2018-01-01 RX ADMIN — INSULIN LISPRO 6 UNITS: 100 INJECTION, SUSPENSION SUBCUTANEOUS at 14:07

## 2018-01-01 RX ADMIN — ISOSORBIDE MONONITRATE 60 MG: 60 TABLET, EXTENDED RELEASE ORAL at 10:51

## 2018-01-01 RX ADMIN — CARVEDILOL 12.5 MG: 12.5 TABLET, FILM COATED ORAL at 08:16

## 2018-01-01 RX ADMIN — INSULIN LISPRO 2 UNITS: 100 INJECTION, SOLUTION INTRAVENOUS; SUBCUTANEOUS at 12:48

## 2018-01-01 RX ADMIN — ISOSORBIDE MONONITRATE 90 MG: 60 TABLET, EXTENDED RELEASE ORAL at 08:02

## 2018-01-01 RX ADMIN — Medication: at 17:35

## 2018-01-01 RX ADMIN — INSULIN LISPRO 8 UNITS: 100 INJECTION, SUSPENSION SUBCUTANEOUS at 18:17

## 2018-01-01 RX ADMIN — TORSEMIDE 20 MG: 20 TABLET ORAL at 10:51

## 2018-01-01 RX ADMIN — Medication 10 ML: at 09:23

## 2018-01-01 RX ADMIN — CARVEDILOL 6.25 MG: 6.25 TABLET, FILM COATED ORAL at 17:31

## 2018-01-01 RX ADMIN — ISOSORBIDE MONONITRATE 60 MG: 60 TABLET, EXTENDED RELEASE ORAL at 10:21

## 2018-01-01 ASSESSMENT — ENCOUNTER SYMPTOMS
COLOR CHANGE: 0
NAUSEA: 0
SHORTNESS OF BREATH: 0
COUGH: 0
CONSTIPATION: 0
SORE THROAT: 0
TROUBLE SWALLOWING: 0
NAUSEA: 0
ABDOMINAL PAIN: 1
VOMITING: 0
CHOKING: 0
CONSTIPATION: 0
DIARRHEA: 0
CONSTIPATION: 0
NAUSEA: 0
CHEST TIGHTNESS: 0
ABDOMINAL DISTENTION: 0
CHOKING: 0
EYE REDNESS: 0
COUGH: 0
BLOOD IN STOOL: 0
SORE THROAT: 0
TROUBLE SWALLOWING: 0
VOMITING: 0
WHEEZING: 0
ABDOMINAL DISTENTION: 0
VOMITING: 0
ABDOMINAL PAIN: 0
CONSTIPATION: 0
COUGH: 1
ABDOMINAL PAIN: 0
DIARRHEA: 0
SHORTNESS OF BREATH: 0
DIARRHEA: 0
ABDOMINAL DISTENTION: 0
VOICE CHANGE: 0
COUGH: 0
WHEEZING: 0
SINUS PRESSURE: 0
SORE THROAT: 0
EYE PAIN: 0
BLOOD IN STOOL: 1
TROUBLE SWALLOWING: 0
SHORTNESS OF BREATH: 0
FACIAL SWELLING: 0
WHEEZING: 0
EYES NEGATIVE: 1
CHANGE IN BOWEL HABIT: 1
PHOTOPHOBIA: 0
WHEEZING: 0
VOMITING: 0
SHORTNESS OF BREATH: 0
BLOOD IN STOOL: 0
TROUBLE SWALLOWING: 0
BLOOD IN STOOL: 0
DIARRHEA: 0
SORE THROAT: 0
VOMITING: 0
EYE ITCHING: 0
SHORTNESS OF BREATH: 1
COLOR CHANGE: 0
PHOTOPHOBIA: 0
COLOR CHANGE: 0
RHINORRHEA: 0
CONSTIPATION: 0
ABDOMINAL PAIN: 0
DIARRHEA: 0
CHEST TIGHTNESS: 0
DIARRHEA: 0
ABDOMINAL PAIN: 0
CHEST TIGHTNESS: 0
SHORTNESS OF BREATH: 1
RHINORRHEA: 0
SORE THROAT: 0
ABDOMINAL PAIN: 0
SHORTNESS OF BREATH: 0
COUGH: 0
VOMITING: 0
ANAL BLEEDING: 0
COLOR CHANGE: 0
WHEEZING: 0
VOMITING: 0
CHEST TIGHTNESS: 0
COUGH: 0
CHEST TIGHTNESS: 0
SORE THROAT: 0
WHEEZING: 0
EYE DISCHARGE: 0
BLOOD IN STOOL: 0
ABDOMINAL DISTENTION: 0
BLOOD IN STOOL: 0
WHEEZING: 0
ANAL BLEEDING: 0
PHOTOPHOBIA: 0
NAUSEA: 0
CHOKING: 0
COUGH: 0
COLOR CHANGE: 0
TROUBLE SWALLOWING: 0
EYE PAIN: 0
EYE REDNESS: 0
BLOOD IN STOOL: 0
NAUSEA: 0
ABDOMINAL DISTENTION: 0
VOMITING: 0
COUGH: 0
BLOOD IN STOOL: 1
COLOR CHANGE: 0
CHOKING: 0
SHORTNESS OF BREATH: 0
COLOR CHANGE: 0
CHEST TIGHTNESS: 0
CHOKING: 0
SHORTNESS OF BREATH: 1
DIARRHEA: 0
BLOOD IN STOOL: 0
BACK PAIN: 0
VOMITING: 0
NAUSEA: 1
ABDOMINAL DISTENTION: 0
COLOR CHANGE: 0
ABDOMINAL PAIN: 0
VOMITING: 0
CHEST TIGHTNESS: 0
COUGH: 0
SORE THROAT: 0
ABDOMINAL DISTENTION: 0
FACIAL SWELLING: 0
CHOKING: 0
DIARRHEA: 0
NAUSEA: 0
TROUBLE SWALLOWING: 0
SHORTNESS OF BREATH: 1
ABDOMINAL PAIN: 0
COUGH: 0
CONSTIPATION: 0
SORE THROAT: 0
SHORTNESS OF BREATH: 0
EYE DISCHARGE: 0
NAUSEA: 0
SHORTNESS OF BREATH: 1
WHEEZING: 0
ABDOMINAL PAIN: 1
CHOKING: 0
STRIDOR: 0
ABDOMINAL DISTENTION: 1
DIARRHEA: 0
TROUBLE SWALLOWING: 0
COUGH: 0
NAUSEA: 0
CONSTIPATION: 0
CHEST TIGHTNESS: 0

## 2018-01-01 ASSESSMENT — PAIN SCALES - GENERAL
PAINLEVEL_OUTOF10: 2
PAINLEVEL_OUTOF10: 9
PAINLEVEL_OUTOF10: 3
PAINLEVEL_OUTOF10: 5
PAINLEVEL_OUTOF10: 3
PAINLEVEL_OUTOF10: 5
PAINLEVEL_OUTOF10: 5
PAINLEVEL_OUTOF10: 4
PAINLEVEL_OUTOF10: 4
PAINLEVEL_OUTOF10: 0
PAINLEVEL_OUTOF10: 6
PAINLEVEL_OUTOF10: 0
PAINLEVEL_OUTOF10: 4
PAINLEVEL_OUTOF10: 5
PAINLEVEL_OUTOF10: 0
PAINLEVEL_OUTOF10: 4
PAINLEVEL_OUTOF10: 7
PAINLEVEL_OUTOF10: 3
PAINLEVEL_OUTOF10: 0
PAINLEVEL_OUTOF10: 0
PAINLEVEL_OUTOF10: 5
PAINLEVEL_OUTOF10: 4
PAINLEVEL_OUTOF10: 0
PAINLEVEL_OUTOF10: 4
PAINLEVEL_OUTOF10: 0
PAINLEVEL_OUTOF10: 3
PAINLEVEL_OUTOF10: 5
PAINLEVEL_OUTOF10: 0
PAINLEVEL_OUTOF10: 5
PAINLEVEL_OUTOF10: 0
PAINLEVEL_OUTOF10: 0
PAINLEVEL_OUTOF10: 2
PAINLEVEL_OUTOF10: 0
PAINLEVEL_OUTOF10: 0
PAINLEVEL_OUTOF10: 5
PAINLEVEL_OUTOF10: 4
PAINLEVEL_OUTOF10: 0
PAINLEVEL_OUTOF10: 5
PAINLEVEL_OUTOF10: 0
PAINLEVEL_OUTOF10: 4
PAINLEVEL_OUTOF10: 0
PAINLEVEL_OUTOF10: 5
PAINLEVEL_OUTOF10: 0
PAINLEVEL_OUTOF10: 5
PAINLEVEL_OUTOF10: 0
PAINLEVEL_OUTOF10: 2
PAINLEVEL_OUTOF10: 0

## 2018-01-01 ASSESSMENT — PAIN DESCRIPTION - ORIENTATION
ORIENTATION: LEFT;RIGHT
ORIENTATION: LEFT
ORIENTATION: LEFT
ORIENTATION: LEFT;RIGHT
ORIENTATION: RIGHT;LEFT
ORIENTATION: LEFT

## 2018-01-01 ASSESSMENT — PAIN DESCRIPTION - PAIN TYPE
TYPE: CHRONIC PAIN
TYPE: ACUTE PAIN
TYPE: ACUTE PAIN
TYPE: CHRONIC PAIN
TYPE: ACUTE PAIN
TYPE: CHRONIC PAIN

## 2018-01-01 ASSESSMENT — PAIN DESCRIPTION - DESCRIPTORS
DESCRIPTORS: ACHING
DESCRIPTORS: DISCOMFORT
DESCRIPTORS: ACHING

## 2018-01-01 ASSESSMENT — PAIN DESCRIPTION - LOCATION
LOCATION: KNEE
LOCATION: GENERALIZED
LOCATION: KNEE
LOCATION: ABDOMEN
LOCATION: KNEE
LOCATION: GENERALIZED
LOCATION: KNEE
LOCATION: GENERALIZED;KNEE;BACK
LOCATION: BACK
LOCATION: NECK;ANKLE
LOCATION: GENERALIZED

## 2018-01-01 ASSESSMENT — PAIN DESCRIPTION - FREQUENCY
FREQUENCY: INTERMITTENT
FREQUENCY: CONTINUOUS
FREQUENCY: INTERMITTENT
FREQUENCY: CONTINUOUS
FREQUENCY: INTERMITTENT
FREQUENCY: CONTINUOUS

## 2018-01-03 PROBLEM — K25.4 CHRONIC GASTRIC ULCER WITH HEMORRHAGE: Status: ACTIVE | Noted: 2018-01-01

## 2018-01-03 NOTE — PROGRESS NOTES
Patient: Suresh Villasenor    YOB: 1929    Date: 1/3/18    Chief Complaint   Patient presents with    Follow-Up from Hospital     Patient was at [facility] emergency room on 12/19/2017 for bleeding around colostomy stoma. She was released home. She went back to emergency room on 12/28/2017 for ulceration of peristoma. The area was cleaned and changed. Patient Active Problem List    Diagnosis Date Noted    Chronic gastric ulcer with hemorrhage 01/03/2018    Skin ulcer of abdomen, limited to breakdown of skin ([address] 12/28/2017    Anemia 12/18/2017    Skin ulcer of abdomen ([address] 08/24/2017    Acute gout of right wrist 01/17/2017    Type 2 diabetes mellitus with stage 4 chronic kidney disease, with long-term current use of insulin ([address] 08/31/2016    Sacral ulcer ([address] 08/31/2016    GI bleed 09/18/2014    A-fib ([address] 06/20/2013    Long term current use of anticoagulant therapy 06/20/2013    PUD (peptic ulcer disease) 04/26/2012    CRF (chronic renal failure) 03/26/2012    Essential hypertension     Mixed hyperlipidemia     CAD (coronary artery disease)        Allergies   Allergen Reactions    Amaryl [Glimepiride]     Avelox [Moxifloxacin Hcl In Nacl]     Bactrim     Darvon [Propoxyphene Hcl]      \"FEEL WEIRD\"    Glynase [Glyburide]     Pcn [Penicillins]     Starlix [Nateglinide] Nausea Only    Sulfamethoxazole-Trimethoprim     Tavist  [Clemastine]     Tavist-D [[employer]tsons Dayhist-D]      MOST DECONGESTANTS    Amoxicillin Rash       Vitals:    01/03/18 1022   BP: 110/60   Site: Right Arm   Position: Sitting   Cuff Size: Small Adult   Pulse: 62   Resp: 16   Temp: 97.7 °F (36.5 °C)   TempSrc: Oral   SpO2: 98%   Weight: 154 lb 6.4 oz (70 kg)   Height: 5' 2.5\" (1.588 m)      Body mass index is 27.79 kg/m².     Treatment Adherence:   Medication compliance:  compliant all of the time  Diet compliance:  compliant most of the time  Weight trend: decreasing  Current exercise: no regular Cholecalciferol (VITAMIN D) 2000 UNITS CAPS capsule Take 2,000 Units by mouth daily       aspirin 81 MG tablet Take 81 mg by mouth 2 times daily       nitroGLYCERIN (NITROSTAT) 0.4 MG SL tablet Place 1 tablet under the tongue every 5 minutes as needed. 25 tablet 3    carvedilol (COREG) 6.25 MG tablet Take 6.25 mg by mouth 2 times daily       isosorbide mononitrate (IMDUR) 60 MG CR tablet Take 60 mg by mouth daily       Calcium Carbonate Antacid (TUMS E-X PO) Take 750 tablets by mouth 2 times daily.  ferrous sulfate 325 (65 FE) MG tablet Take 325 mg by mouth daily (with breakfast). No current facility-administered medications for this visit. No orders of the defined types were placed in this encounter. No orders of the defined types were placed in this encounter. Return if symptoms worsen or fail to improve.     Dr. Sheyla Diallo      1/3/18  11:53 AM

## 2018-02-20 NOTE — PROGRESS NOTES
Subjective:      Patient Id:Margaret Daryle Reagin is a 80 y.o. female seen today at their home in [facility] for follow up symptom management. Patient presents today with   Chief Complaint   Patient presents with    Edema    Pain       HPI  Patient appears calm, relaxed, NAD, no sedation. Patient offers no new complaints today and states she is doing about the same. She continues to play bingo twice weekly at the local [employer]. She offers no new complaints of pain. Appetite is good most days. No new swelling. Denies SOB or CP. Dtr Jitendra Orta checks in on her at least twice daily. Past Medical History:   Diagnosis Date    Allergic rhinitis     Anemia     [facility]     CA - cancer of rectum     CAD (coronary artery disease)     Cancer (HCC) 2002-L BREAST DUCTAL    Cancer of gallbladder (HCC)     Chest pain, atypical 2010 AND 2011    CHF (congestive heart failure) ([facility]     Cirrhosis ([facility]     CHRONIC    Fibromyalgia     GI bleed 09/18/14    Gout 01/2017    H/O: stroke     Hernia of abdominal wall     History of mammography, diagnostic 2011-RIGHT-CAT 2    Hyperlipidemia     Hypertension     Liver disease     LONG TERM ANTICOAGULENT USE     Obesity     OP (osteoporosis) 2005    H/O L1 COMPRESSION FX    Osteoarthritis     Type II or unspecified type diabetes mellitus without mention of complication, not stated as uncontrolled      Past Surgical History:   Procedure Laterality Date    APPENDECTOMY  1945    ARTERIAL BYPASS SURGRY  12/1999    CABG triple by pass    BREAST SURGERY  2003    Left Modified Radical Mastectomy    CARDIAC CATHETERIZATION  2008    COLON SURGERY  05/22/2006    AP resection    CORONARY ARTERY BYPASS GRAFT  1999    CYST REMOVAL  L AXILLA    DILATION AND CURETTAGE OF UTERUS      FOOT SURGERY  RIGHT TUMOR REMOVED    PACEMAKER PLACEMENT  2010    TONSILLECTOMY AND ADENOIDECTOMY  1940    UPPER GASTROINTESTINAL ENDOSCOPY  6/16/13    DR. ELO DIOR GASTROINTESTINAL ENDOSCOPY  09/19/2014    Dr. Venkata Chou Marital status:      Spouse name: N/A    Number of children: N/A    Years of education: N/A     Occupational History    Not on file. Social History Main Topics    Smoking status: Former Smoker     Types: Cigarettes     Quit date: 1/1/1970    Smokeless tobacco: Never Used    Alcohol use No    Drug use: No    Sexual activity: No      Comment:      Other Topics Concern    Not on file     Social History Narrative    No narrative on file     Family History   Problem Relation Age of Onset    Diabetes Mother     Heart Disease Mother     High Blood Pressure Mother     Cancer Sister      BREAST AND LUNG    Heart Disease Sister     Cancer Brother      LYMPHOMA    Diabetes Brother     Heart Disease Brother     High Blood Pressure Brother     Stroke Brother     High Blood Pressure Other      ENTIRE FAMILY     Allergies   Allergen Reactions    Amaryl [Glimepiride]     Avelox [Moxifloxacin Hcl In Nacl]     Bactrim     Darvon [Propoxyphene Hcl]      \"FEEL WEIRD\"    Glynase [Glyburide]     Pcn [Penicillins]     Starlix [Nateglinide] Nausea Only    Sulfamethoxazole-Trimethoprim     Tavist  [Clemastine]     Tavist-D [[employer]ber[employer]ons Dayhist-D]      MOST DECONGESTANTS    Amoxicillin Rash     Current Outpatient Prescriptions on File Prior to Visit   Medication Sig Dispense Refill    spironolactone (ALDACTONE) 25 MG tablet TAKE 1 TABLET BY MOUTH  DAILY 90 tablet 0    Insulin Pen Needle (UNIFINE PENTIPS) 31G X 8 MM MISC Use twice daily or as directed 100 each 5    HYDROcodone-acetaminophen (VICODIN) 5-500 MG per tablet Take 1 tablet by mouth every 6 hours as needed for Pain.       torsemide (DEMADEX) 20 MG tablet Take 1 tablet by mouth daily 30 tablet 0    acetaminophen (APAP EXTRA STRENGTH) 500 MG tablet Take 1 tablet by mouth every 6 hours as needed for Pain 20 tablet 0    magnesium oxide (MAG-OX) 400 (241.3 MG) MG TABS tablet TAKE 1 TABLET TWICE DAILY FOR 2 DAYS, THEN TAKE 1 TABLET DAILY THEREAFTER  11    Cholecalciferol (VITAMIN D) 2000 UNITS CAPS capsule Take 2,000 Units by mouth daily       aspirin 81 MG tablet Take 81 mg by mouth daily       nitroGLYCERIN (NITROSTAT) 0.4 MG SL tablet Place 1 tablet under the tongue every 5 minutes as needed. 25 tablet 3    carvedilol (COREG) 6.25 MG tablet Take 6.25 mg by mouth 2 times daily       isosorbide mononitrate (IMDUR) 60 MG CR tablet Take 60 mg by mouth daily       Calcium Carbonate Antacid (TUMS E-X PO) Take 750 tablets by mouth 2 times daily.  ferrous sulfate 325 (65 FE) MG tablet Take 325 mg by mouth daily (with breakfast). No current facility-administered medications on file prior to visit. Review of Systems   Constitutional: Negative for activity change, appetite change, chills, fatigue, fever and unexpected weight change. HENT: Negative for congestion, mouth sores, nosebleeds, sore throat and trouble swallowing. Eyes: Negative for visual disturbance. Respiratory: Negative for cough, choking, chest tightness, shortness of breath and wheezing. Cardiovascular: Positive for leg swelling. Negative for chest pain and palpitations. Gastrointestinal: Negative for abdominal distention, abdominal pain, blood in stool, constipation, diarrhea, nausea and vomiting. Genitourinary: Negative for dysuria, flank pain and hematuria. Musculoskeletal: Negative for arthralgias, joint swelling and myalgias. Skin: Negative for color change, rash and wound. Neurological: Negative for dizziness, seizures, syncope, weakness, light-headedness, numbness and headaches. Psychiatric/Behavioral: Negative for agitation, behavioral problems, confusion, dysphoric mood, hallucinations, sleep disturbance and suicidal ideas. The patient is not nervous/anxious.          Negative existential symptoms           Objective:   BP (!) 122/50 symptoms worsen or fail to improve.     Denice Purvis, NP

## 2018-03-02 NOTE — PROGRESS NOTES
Subjective:      Patient Id:Miriam Man is a 80 y.o. female seen today at their home in [facility] for follow up symptom management. Patient presents today with   Chief Complaint   Patient presents with    Fatigue    Edema    Other     debility          HPI  Patient appears calm, relaxed, NAD, no sedation. Patient complains of feeling fatigued and has been sleeping more during th day. Denies SOB, CP, cough, fever, chills, emesis, n/v/d or abdominal pain. NO s/s of bleeding. Pt states the swelling in her legs has greatly improved. Pt is losing weight steadily but admits to eating less.     Past Medical History:   Diagnosis Date    Allergic rhinitis     Anemia     [facility] ([facility]     CA - cancer of rectum     CAD (coronary artery disease)     Cancer (HCC) 2002-L BREAST DUCTAL    Cancer of gallbladder (HCC)     Chest pain, atypical 2010 AND 2011    CHF (congestive heart failure) ([facility]     Cirrhosis ([facility]     CHRONIC    Fibromyalgia     GI bleed 09/18/14    Gout 01/2017    H/O: stroke     Hernia of abdominal wall     History of mammography, diagnostic 2011-RIGHT-CAT 2    Hyperlipidemia     Hypertension     Liver disease     LONG TERM ANTICOAGULENT USE     Obesity     OP (osteoporosis) 2005    H/O L1 COMPRESSION FX    Osteoarthritis     Type II or unspecified type diabetes mellitus without mention of complication, not stated as uncontrolled      Past Surgical History:   Procedure Laterality Date    APPENDECTOMY  1945    ARTERIAL BYPASS SURGRY  12/1999    CABG triple by pass    BREAST SURGERY  2003    Left Modified Radical Mastectomy    CARDIAC CATHETERIZATION  2008    COLON SURGERY  05/22/2006    AP resection    CORONARY ARTERY BYPASS GRAFT  1999    CYST REMOVAL  L AXILLA    DILATION AND CURETTAGE OF UTERUS      FOOT SURGERY  RIGHT TUMOR REMOVED    PACEMAKER PLACEMENT  2010    TONSILLECTOMY AND ADENOIDECTOMY  1940    UPPER GASTROINTESTINAL ENDOSCOPY  6/16/13     UPPER GASTROINTESTINAL ENDOSCOPY  09/19/2014    Dr. Fco Altamirano Marital status:      Spouse name: N/A    Number of children: N/A    Years of education: N/A     Occupational History    Not on file. Social History Main Topics    Smoking status: Former Smoker     Types: Cigarettes     Quit date: 1/1/1970    Smokeless tobacco: Never Used    Alcohol use No    Drug use: No    Sexual activity: No      Comment:      Other Topics Concern    Not on file     Social History Narrative    No narrative on file     Family History   Problem Relation Age of Onset    Diabetes Mother     Heart Disease Mother     High Blood Pressure Mother     Cancer Sister      BREAST AND LUNG    Heart Disease Sister     Cancer Brother      LYMPHOMA    Diabetes Brother     Heart Disease Brother     High Blood Pressure Brother     Stroke Brother     High Blood Pressure Other      ENTIRE FAMILY     Allergies   Allergen Reactions    Amaryl [Glimepiride]     Avelox [Moxifloxacin Hcl In Nacl]     Bactrim     Darvon [Propoxyphene Hcl]      \"FEEL WEIRD\"    Glynase [Glyburide]     Pcn [Penicillins]     Starlix [Nateglinide] Nausea Only    Sulfamethoxazole-Trimethoprim     Tavist  [Clemastine]     Tavist-D [[employer]bertsons Dayhist-D]      MOST DECONGESTANTS    Amoxicillin Rash     Current Outpatient Prescriptions on File Prior to Visit   Medication Sig Dispense Refill    spironolactone (ALDACTONE) 25 MG tablet TAKE 1 TABLET BY MOUTH  DAILY 90 tablet 3    HUMALOG MIX 75/25 KWIKPEN (75-25) 100 UNIT/ML SUPN injection pen       Insulin Pen Needle (UNIFINE PENTIPS) 31G X 8 MM MISC Use twice daily or as directed 100 each 5    HYDROcodone-acetaminophen (VICODIN) 5-500 MG per tablet Take 1 tablet by mouth every 6 hours as needed for Pain.       acetaminophen (APAP EXTRA STRENGTH) 500 MG tablet Take 1 tablet by mouth every 6 hours as needed for Pain 20 tablet 0   

## 2018-03-16 NOTE — PROGRESS NOTES
REMOVED    PACEMAKER PLACEMENT  2010    TONSILLECTOMY AND ADENOIDECTOMY  1940    UPPER GASTROINTESTINAL ENDOSCOPY  6/16/13    DR. GASCA    UPPER GASTROINTESTINAL ENDOSCOPY  09/19/2014    Dr. Belkis Solis     Social History     Social History    Marital status:      Spouse name: N/A    Number of children: N/A    Years of education: N/A     Occupational History    Not on file.      Social History Main Topics    Smoking status: Former Smoker     Types: Cigarettes     Quit date: 1/1/1970    Smokeless tobacco: Never Used    Alcohol use No    Drug use: No    Sexual activity: No      Comment:      Other Topics Concern    Not on file     Social History Narrative    No narrative on file     Family History   Problem Relation Age of Onset    Diabetes Mother     Heart Disease Mother     High Blood Pressure Mother     Cancer Sister      BREAST AND LUNG    Heart Disease Sister     Cancer Brother      [place]    Diabetes Brother     Heart Disease Brother     High Blood Pressure Brother     Stroke Brother     High Blood Pressure Other      ENTIRE FAMILY     Allergies   Allergen Reactions    Amaryl [Glimepiride]     Avelox [Moxifloxacin Hcl In Nacl]     Bactrim     Darvon [Propoxyphene Hcl]      \"FEEL WEIRD\"    Glynase [Glyburide]     Pcn [Penicillins]     Starlix [Nateglinide] Nausea Only    Sulfamethoxazole-Trimethoprim     Tavist  [Clemastine]     Tavist-D [[employer] Dayhist-D]      MOST DECONGESTANTS    Amoxicillin Rash     Current Outpatient Prescriptions on File Prior to Visit   Medication Sig Dispense Refill    torsemide (DEMADEX) 20 MG tablet Take 0.5 tablets by mouth daily 30 tablet 0    spironolactone (ALDACTONE) 25 MG tablet TAKE 1 TABLET BY MOUTH  DAILY 90 tablet 3    HUMALOG MIX 75/25 KWIKPEN (75-25) 100 UNIT/ML SUPN injection pen       Insulin Pen Needle (UNIFINE PENTIPS) 31G X 8 MM MISC Use twice daily or as directed 100 each 5    HYDROcodone-acetaminophen (VICODIN) 5-500 MG per tablet Take 1 tablet by mouth every 6 hours as needed for Pain.  acetaminophen (APAP EXTRA STRENGTH) 500 MG tablet Take 1 tablet by mouth every 6 hours as needed for Pain 20 tablet 0    magnesium oxide (MAG-OX) 400 (241.3 MG) MG TABS tablet TAKE 1 TABLET TWICE DAILY FOR 2 DAYS, THEN TAKE 1 TABLET DAILY THEREAFTER  11    Cholecalciferol (VITAMIN D) 2000 UNITS CAPS capsule Take 2,000 Units by mouth daily       aspirin 81 MG tablet Take 81 mg by mouth daily       nitroGLYCERIN (NITROSTAT) 0.4 MG SL tablet Place 1 tablet under the tongue every 5 minutes as needed. 25 tablet 3    carvedilol (COREG) 6.25 MG tablet Take 6.25 mg by mouth 2 times daily       isosorbide mononitrate (IMDUR) 60 MG CR tablet Take 60 mg by mouth daily       Calcium Carbonate Antacid (TUMS E-X PO) Take 750 tablets by mouth 2 times daily.  ferrous sulfate 325 (65 FE) MG tablet Take 325 mg by mouth daily (with breakfast). No current facility-administered medications on file prior to visit. Review of Systems   Constitutional: Positive for fatigue. Negative for activity change, appetite change, chills, fever and unexpected weight change. HENT: Negative for congestion, mouth sores, nosebleeds, sore throat and trouble swallowing. Eyes: Negative for visual disturbance. Respiratory: Positive for shortness of breath. Negative for cough, choking, chest tightness and wheezing. Cardiovascular: Positive for leg swelling. Negative for chest pain and palpitations. Gastrointestinal: Negative for abdominal distention, abdominal pain, blood in stool, constipation, diarrhea, nausea and vomiting. Genitourinary: Negative for dysuria, flank pain and hematuria. Musculoskeletal: Positive for gait problem. Negative for arthralgias, joint swelling and myalgias. Gait steady with walker. Pt is \"hunched\" over. Skin: Negative for color change, rash and wound.    Neurological: Negative for dizziness, seizures, syncope, weakness, light-headedness, numbness and headaches. Psychiatric/Behavioral: Negative for agitation, behavioral problems, confusion, dysphoric mood, hallucinations, sleep disturbance and suicidal ideas. The patient is not nervous/anxious. Negative existential symptoms           Objective:   BP (!) 129/56   Pulse 66   Resp 16   Wt 145 lb (65.8 kg)   BMI 26.10 kg/m²     Physical Exam   Constitutional: She is oriented to person, place, and time. She is cooperative. No distress. Chronically ill appearing   HENT:   Head: Normocephalic and atraumatic. Nose: Nose normal.   Mouth/Throat: Oropharynx is clear and moist.   Eyes: Conjunctivae are normal. No scleral icterus. Neck: Normal range of motion. Neck supple. No JVD present. No thyromegaly present. Cardiovascular: Normal rate, regular rhythm and intact distal pulses. Pulmonary/Chest: Effort normal and breath sounds normal.   Abdominal: Soft. Bowel sounds are normal. She exhibits no distension. There is no tenderness. Musculoskeletal: She exhibits edema. She exhibits no tenderness. Able to move all 4 extremities  Trace-1+ edema to BLE   Lymphadenopathy:     She has no cervical adenopathy. Neurological: She is alert and oriented to person, place, and time. Skin: Skin is warm and dry. No rash noted. Psychiatric: She has a normal mood and affect. Her behavior is normal. Judgment and thought content normal.   Vitals reviewed. Assessment/Plan:      1. Edema extremities  r/t CHF  Stable. Continue Torsemide as RX as BP is normal today. Continue weekly weights  Leg elevation  Limit salt  Call for weight gain, swelling, SOB or CP    2. SOB (shortness of breath)  r/t CHF  Symptoms at baseline  Continue cardiac meds as RX    3. Age-related physical debility  No change in function or cognition from baseline  Reviewed safety and comfort measures        4.  Palliative care encounter        Much active listening, emotional

## 2018-04-13 PROBLEM — K92.2 GIB (GASTROINTESTINAL BLEEDING): Status: ACTIVE | Noted: 2018-01-01

## 2018-04-17 PROBLEM — D62 ACUTE BLOOD LOSS ANEMIA: Status: ACTIVE | Noted: 2018-01-01

## 2018-04-17 PROBLEM — D68.318 HEMORRHAGIC DISORDER DUE TO CIRCULATING ANTICOAGULANTS (HCC): Chronic | Status: ACTIVE | Noted: 2018-01-01

## 2018-04-17 PROBLEM — D62 ACUTE BLOOD LOSS ANEMIA: Status: RESOLVED | Noted: 2018-01-01 | Resolved: 2018-01-01

## 2018-04-17 PROBLEM — K92.2 GIB (GASTROINTESTINAL BLEEDING): Status: RESOLVED | Noted: 2018-01-01 | Resolved: 2018-01-01

## 2018-04-17 PROBLEM — D68.318 HEMORRHAGIC DISORDER DUE TO CIRCULATING ANTICOAGULANTS (HCC): Chronic | Status: RESOLVED | Noted: 2018-01-01 | Resolved: 2018-01-01

## 2018-04-18 PROBLEM — E11.65 TYPE 2 DIABETES MELLITUS WITH HYPERGLYCEMIA, WITH LONG-TERM CURRENT USE OF INSULIN (HCC): Status: ACTIVE | Noted: 2018-01-01

## 2018-04-18 PROBLEM — R26.9 ABNORMALITY OF GAIT AND MOBILITY: Status: ACTIVE | Noted: 2018-01-01

## 2018-04-18 PROBLEM — R53.1 GENERALIZED WEAKNESS: Status: ACTIVE | Noted: 2018-01-01

## 2018-04-18 PROBLEM — R26.9 GAIT ABNORMALITY: Status: ACTIVE | Noted: 2018-01-01

## 2018-04-18 PROBLEM — Z79.4 TYPE 2 DIABETES MELLITUS WITH HYPERGLYCEMIA, WITH LONG-TERM CURRENT USE OF INSULIN (HCC): Status: ACTIVE | Noted: 2018-01-01

## 2018-05-18 PROBLEM — M79.89 LEG SWELLING: Status: ACTIVE | Noted: 2018-01-01

## 2018-05-25 PROBLEM — R26.9 GAIT ABNORMALITY: Status: RESOLVED | Noted: 2018-01-01 | Resolved: 2018-01-01

## 2018-05-25 PROBLEM — Z79.4 TYPE 2 DIABETES MELLITUS WITH HYPERGLYCEMIA, WITH LONG-TERM CURRENT USE OF INSULIN (HCC): Status: RESOLVED | Noted: 2018-01-01 | Resolved: 2018-01-01

## 2018-05-25 PROBLEM — E11.65 TYPE 2 DIABETES MELLITUS WITH HYPERGLYCEMIA, WITH LONG-TERM CURRENT USE OF INSULIN (HCC): Status: RESOLVED | Noted: 2018-01-01 | Resolved: 2018-01-01

## 2018-05-25 PROBLEM — K25.4 CHRONIC GASTRIC ULCER WITH HEMORRHAGE: Status: RESOLVED | Noted: 2018-01-01 | Resolved: 2018-01-01

## 2018-05-25 PROBLEM — D64.9 ANEMIA: Status: RESOLVED | Noted: 2017-01-01 | Resolved: 2018-01-01

## 2018-05-25 PROBLEM — M10.9 ACUTE GOUT OF RIGHT WRIST: Status: RESOLVED | Noted: 2017-01-17 | Resolved: 2018-01-01

## 2018-05-25 PROBLEM — L98.491 SKIN ULCER OF ABDOMEN, LIMITED TO BREAKDOWN OF SKIN (HCC): Status: RESOLVED | Noted: 2017-01-01 | Resolved: 2018-01-01

## 2018-05-25 PROBLEM — L98.499 SKIN ULCER OF ABDOMEN (HCC): Status: RESOLVED | Noted: 2017-01-01 | Resolved: 2018-01-01

## 2018-05-27 PROBLEM — K92.2 GI BLEED: Status: ACTIVE | Noted: 2018-01-01

## 2018-11-01 ENCOUNTER — CARE COORDINATION (OUTPATIENT)
Dept: CARE COORDINATION | Age: 83
End: 2018-11-01